# Patient Record
Sex: FEMALE | Race: WHITE | NOT HISPANIC OR LATINO
[De-identification: names, ages, dates, MRNs, and addresses within clinical notes are randomized per-mention and may not be internally consistent; named-entity substitution may affect disease eponyms.]

---

## 2017-02-15 ENCOUNTER — APPOINTMENT (OUTPATIENT)
Dept: ENDOCRINOLOGY | Facility: CLINIC | Age: 37
End: 2017-02-15

## 2017-02-15 VITALS
WEIGHT: 199 LBS | BODY MASS INDEX: 31.23 KG/M2 | OXYGEN SATURATION: 99 % | HEIGHT: 67 IN | DIASTOLIC BLOOD PRESSURE: 60 MMHG | HEART RATE: 81 BPM | SYSTOLIC BLOOD PRESSURE: 110 MMHG

## 2017-03-02 ENCOUNTER — RESULT REVIEW (OUTPATIENT)
Age: 37
End: 2017-03-02

## 2017-09-27 ENCOUNTER — RESULT REVIEW (OUTPATIENT)
Age: 37
End: 2017-09-27

## 2017-10-04 ENCOUNTER — CLINICAL ADVICE (OUTPATIENT)
Age: 37
End: 2017-10-04

## 2017-10-30 ENCOUNTER — RX RENEWAL (OUTPATIENT)
Age: 37
End: 2017-10-30

## 2018-04-25 ENCOUNTER — RESULT REVIEW (OUTPATIENT)
Age: 38
End: 2018-04-25

## 2018-05-07 ENCOUNTER — RX RENEWAL (OUTPATIENT)
Age: 38
End: 2018-05-07

## 2018-06-13 ENCOUNTER — APPOINTMENT (OUTPATIENT)
Dept: ENDOCRINOLOGY | Facility: CLINIC | Age: 38
End: 2018-06-13

## 2018-10-03 ENCOUNTER — APPOINTMENT (OUTPATIENT)
Dept: ENDOCRINOLOGY | Facility: CLINIC | Age: 38
End: 2018-10-03

## 2018-10-24 ENCOUNTER — APPOINTMENT (OUTPATIENT)
Dept: ENDOCRINOLOGY | Facility: CLINIC | Age: 38
End: 2018-10-24
Payer: COMMERCIAL

## 2018-10-24 VITALS
HEART RATE: 88 BPM | WEIGHT: 234 LBS | OXYGEN SATURATION: 98 % | DIASTOLIC BLOOD PRESSURE: 60 MMHG | BODY MASS INDEX: 36.73 KG/M2 | HEIGHT: 67 IN | SYSTOLIC BLOOD PRESSURE: 120 MMHG

## 2018-10-24 PROCEDURE — 99215 OFFICE O/P EST HI 40 MIN: CPT

## 2018-10-29 LAB
17OHP SERPL-MCNC: 26 NG/DL
25(OH)D3 SERPL-MCNC: 20.3 NG/ML
ACTH SER-ACNC: 13 PG/ML
ALBUMIN SERPL ELPH-MCNC: 4.5 G/DL
ALP BLD-CCNC: 111 U/L
ALT SERPL-CCNC: 88 U/L
ANION GAP SERPL CALC-SCNC: 16 MMOL/L
AST SERPL-CCNC: 48 U/L
BILIRUB SERPL-MCNC: 0.3 MG/DL
BUN SERPL-MCNC: 20 MG/DL
CALCIUM SERPL-MCNC: 10.1 MG/DL
CHLORIDE SERPL-SCNC: 104 MMOL/L
CHOLEST SERPL-MCNC: 159 MG/DL
CHOLEST/HDLC SERPL: 5.5 RATIO
CO2 SERPL-SCNC: 21 MMOL/L
CORTIS SERPL-MCNC: 6.1 UG/DL
CREAT SERPL-MCNC: 0.73 MG/DL
DHEA-S SERPL-MCNC: 153 UG/DL
ESTRADIOL SERPL-MCNC: 37 PG/ML
FSH SERPL-MCNC: 5.3 IU/L
GH SERPL-MCNC: 0.06 NG/ML
GLUCOSE SERPL-MCNC: 92 MG/DL
HBA1C MFR BLD HPLC: 5.5 %
HDLC SERPL-MCNC: 29 MG/DL
IGF BP1 SERPL-MCNC: 194 NG/ML
LDLC SERPL CALC-MCNC: 72 MG/DL
LH SERPL-ACNC: 8 IU/L
POTASSIUM SERPL-SCNC: 4.7 MMOL/L
PROLACTIN SERPL-MCNC: 4.8 NG/ML
PROT SERPL-MCNC: 7.3 G/DL
SODIUM SERPL-SCNC: 141 MMOL/L
T4 FREE SERPL-MCNC: 1.2 NG/DL
TESTOST SERPL-MCNC: 21.5 NG/DL
TRIGL SERPL-MCNC: 288 MG/DL
TSH SERPL-ACNC: 0.01 UIU/ML

## 2019-05-06 ENCOUNTER — RESULT REVIEW (OUTPATIENT)
Age: 39
End: 2019-05-06

## 2019-07-24 ENCOUNTER — APPOINTMENT (OUTPATIENT)
Dept: ENDOCRINOLOGY | Facility: CLINIC | Age: 39
End: 2019-07-24
Payer: COMMERCIAL

## 2019-07-24 VITALS
HEART RATE: 84 BPM | OXYGEN SATURATION: 98 % | DIASTOLIC BLOOD PRESSURE: 76 MMHG | HEIGHT: 67 IN | SYSTOLIC BLOOD PRESSURE: 118 MMHG | WEIGHT: 238 LBS | BODY MASS INDEX: 37.35 KG/M2

## 2019-07-24 DIAGNOSIS — R94.6 ABNORMAL RESULTS OF THYROID FUNCTION STUDIES: ICD-10-CM

## 2019-07-24 PROCEDURE — 99215 OFFICE O/P EST HI 40 MIN: CPT

## 2019-07-25 PROBLEM — R94.6 ABNORMAL THYROID FUNCTION TEST: Status: ACTIVE | Noted: 2017-02-15

## 2019-07-29 ENCOUNTER — MEDICATION RENEWAL (OUTPATIENT)
Age: 39
End: 2019-07-29

## 2019-07-29 LAB
25(OH)D3 SERPL-MCNC: 21.7 NG/ML
ACTH SER-ACNC: 18.1 PG/ML
ALBUMIN SERPL ELPH-MCNC: 4.2 G/DL
ALP BLD-CCNC: 85 U/L
ALT SERPL-CCNC: 66 U/L
ANION GAP SERPL CALC-SCNC: 12 MMOL/L
AST SERPL-CCNC: 28 U/L
BILIRUB SERPL-MCNC: 0.4 MG/DL
BUN SERPL-MCNC: 14 MG/DL
CALCIUM SERPL-MCNC: 9.7 MG/DL
CHLORIDE SERPL-SCNC: 109 MMOL/L
CHOLEST SERPL-MCNC: 191 MG/DL
CHOLEST/HDLC SERPL: 7.1 RATIO
CO2 SERPL-SCNC: 22 MMOL/L
CORTIS SERPL-MCNC: 6.6 UG/DL
CREAT SERPL-MCNC: 0.68 MG/DL
ESTIMATED AVERAGE GLUCOSE: 108 MG/DL
ESTRADIOL SERPL-MCNC: 27 PG/ML
FSH SERPL-MCNC: 5.2 IU/L
GH SERPL-MCNC: 0.06 NG/ML
GLUCOSE SERPL-MCNC: 109 MG/DL
HBA1C MFR BLD HPLC: 5.4 %
HDLC SERPL-MCNC: 27 MG/DL
IGF BP1 SERPL-MCNC: 175 NG/ML
INSULIN SERPL-MCNC: 35.6 UU/ML
LDLC SERPL CALC-MCNC: 123 MG/DL
LH SERPL-ACNC: 6.2 IU/L
POTASSIUM SERPL-SCNC: 4.5 MMOL/L
PROLACTIN SERPL-MCNC: 6.4 NG/ML
PROT SERPL-MCNC: 6.5 G/DL
SODIUM SERPL-SCNC: 142 MMOL/L
T3 SERPL-MCNC: 145 NG/DL
T4 FREE SERPL-MCNC: 1 NG/DL
THYROPEROXIDASE AB SERPL IA-ACNC: <10 IU/ML
TRIGL SERPL-MCNC: 206 MG/DL
TSH RECEPTOR AB: <1.1 IU/L
TSH SERPL-ACNC: 0.07 UIU/ML

## 2019-08-01 LAB
MONOMERIC PROLACTIN (ICMA)*: 4.7 NG/ML
PERCENT MACROPROLACTIN: 13 %
PROLACTIN, SERUM (ICMA)*: 5.4 NG/ML

## 2019-08-22 ENCOUNTER — LABORATORY RESULT (OUTPATIENT)
Age: 39
End: 2019-08-22

## 2019-08-22 ENCOUNTER — APPOINTMENT (OUTPATIENT)
Dept: ENDOCRINOLOGY | Facility: CLINIC | Age: 39
End: 2019-08-22
Payer: COMMERCIAL

## 2019-08-22 VITALS — HEART RATE: 70 BPM | SYSTOLIC BLOOD PRESSURE: 100 MMHG | OXYGEN SATURATION: 98 % | DIASTOLIC BLOOD PRESSURE: 70 MMHG

## 2019-08-22 PROCEDURE — 99213 OFFICE O/P EST LOW 20 MIN: CPT | Mod: 25

## 2019-08-22 PROCEDURE — 76536 US EXAM OF HEAD AND NECK: CPT

## 2019-08-22 NOTE — IMPRESSION
[FreeTextEntry1] : Left sided dominant nodule has been FNAed x 2 in the past, now stable in size  [FreeTextEntry2] : Follow up in one year with ultrasound

## 2019-08-22 NOTE — PHYSICAL EXAM
[Alert] : alert [No Acute Distress] : no acute distress [Well Nourished] : well nourished [Well Developed] : well developed [Normal Sclera/Conjunctiva] : normal sclera/conjunctiva [PERRL] : pupils equal, round and reactive to light [No Proptosis] : no proptosis [EOMI] : extra ocular movement intact [Normal Outer Ear/Nose] : the ears and nose were normal in appearance [Normal Hearing] : hearing was normal [Normal TMs] : both tympanic membranes were normal [No Neck Mass] : no neck mass was observed [Supple] : the neck was supple [Thyroid Not Enlarged] : the thyroid was not enlarged [No Respiratory Distress] : no respiratory distress [Normal Rate and Effort] : normal respiratory rhythm and effort [No Accessory Muscle Use] : no accessory muscle use [Clear to Auscultation] : lungs were clear to auscultation bilaterally [Normal Rate] : heart rate was normal  [Normal S1, S2] : normal S1 and S2 [Regular Rhythm] : with a regular rhythm [Normal Bowel Sounds] : normal bowel sounds [Not Tender] : non-tender [Soft] : abdomen soft [Not Distended] : not distended [Normal Gait] : normal gait [No Joint Swelling] : no joint swelling seen [No Clubbing, Cyanosis] : no clubbing  or cyanosis of the fingernails [Normal Strength/Tone] : muscle strength and tone were normal [No Rash] : no rash [No Skin Lesions] : no skin lesions [Normal Reflexes] : deep tendon reflexes were 2+ and symmetric [No Motor Deficits] : the motor exam was normal [No Tremors] : no tremors [Oriented x3] : oriented to person, place, and time [Normal Insight/Judgement] : insight and judgment were intact [Normal Affect] : the affect was normal [Normal Mood] : the mood was normal

## 2019-08-22 NOTE — ASSESSMENT
[FreeTextEntry1] : 38 year old female here for follow up of thyroid nodules. \par In office thyroid ultrasound today showing stability in the right sided thyroid nodules which are sub-cm and also showing stability in the left sided thyroid nodule. \par ( Previously FNA in 2005 and 2009 for the left nodule was benign)\par -Will follow up in one year

## 2019-08-22 NOTE — PROCEDURE
[Report dated ___] : Report dated [unfilled] [Multinodular Goiter] : multinodular goiter [Solid] : solid [Upper] : upper pole there is a  [No calcification] : no calcification [Right Thyroid] : right [Lower] : lower pole there is a  [Heterogeneous] : heterogenous nodule [No vascularity] : no vascularity [1] : 1 [Left Thyroid] : left [Mid] : mid pole there is a  [Mixed] : mixed [Isoechoic solid component] : with isoechoic solid component [Ovoid] : ovoid in shape [Smooth] : smooth [No] : does not have a halo [No calcifications] : no calcifications [Peripheral and scant  internal vascularity] : peripheral and scant internal vascularity [3] : 3 [No abnormal lymph nodes are seen.] : no abnormal lymph nodes are seen [FreeTextEntry1] : 3.6 x 1.15 x 1.59 [FreeTextEntry5] : 3.96 x 2.07 x 2.75 [FreeTextEntry2] : 0.16 [FreeTextEntry3] : 3.83 x 1.75 x 2.95

## 2019-08-22 NOTE — PHYSICAL EXAM
[Alert] : alert [No Acute Distress] : no acute distress [Well Nourished] : well nourished [Well Developed] : well developed [Normal Sclera/Conjunctiva] : normal sclera/conjunctiva [PERRL] : pupils equal, round and reactive to light [EOMI] : extra ocular movement intact [No Proptosis] : no proptosis [Normal Outer Ear/Nose] : the ears and nose were normal in appearance [Normal Hearing] : hearing was normal [Normal TMs] : both tympanic membranes were normal [No Neck Mass] : no neck mass was observed [Thyroid Not Enlarged] : the thyroid was not enlarged [Supple] : the neck was supple [No Respiratory Distress] : no respiratory distress [Normal Rate and Effort] : normal respiratory rhythm and effort [No Accessory Muscle Use] : no accessory muscle use [Clear to Auscultation] : lungs were clear to auscultation bilaterally [Normal Rate] : heart rate was normal  [Normal S1, S2] : normal S1 and S2 [Regular Rhythm] : with a regular rhythm [Normal Bowel Sounds] : normal bowel sounds [Soft] : abdomen soft [Not Tender] : non-tender [Not Distended] : not distended [Normal Gait] : normal gait [No Joint Swelling] : no joint swelling seen [No Clubbing, Cyanosis] : no clubbing  or cyanosis of the fingernails [Normal Strength/Tone] : muscle strength and tone were normal [No Rash] : no rash [No Skin Lesions] : no skin lesions [Normal Reflexes] : deep tendon reflexes were 2+ and symmetric [No Tremors] : no tremors [No Motor Deficits] : the motor exam was normal [Oriented x3] : oriented to person, place, and time [Normal Insight/Judgement] : insight and judgment were intact [Normal Affect] : the affect was normal [Normal Mood] : the mood was normal

## 2019-08-22 NOTE — REVIEW OF SYSTEMS
[Fatigue] : no fatigue [Recent Weight Gain (___ Lbs)] : no recent weight gain [Decreased Appetite] : appetite not decreased [Recent Weight Loss (___ Lbs)] : no recent weight loss [Visual Field Defect] : no visual field defect [Blurry Vision] : no blurred vision [Dry Eyes] : no dryness of the eyes [Eyes Itch] : no itching of the eyes [Dysphagia] : no dysphagia [Neck Pain] : no neck pain [Dysphonia] : no dysphonia [Nasal Congestion] : no nasal congestion [Chest Pain] : no chest pain [Palpitations] : no palpitations [Heart Rate Is Slow] : the heart rate was not slow [Heart Rate Is Fast] : the heart rate was not fast [Shortness Of Breath] : no shortness of breath [Wheezing] : no wheezing was heard [SOB on Exertion] : no shortness of breath during exertion [Cough] : no cough [Nausea] : no nausea [Vomiting] : no vomiting was observed [Diarrhea] : no diarrhea [Constipation] : no constipation [Polyuria] : no polyuria [Irregular Menses] : regular menses [Joint Stiffness] : no joint stiffness [Joint Pain] : no joint pain [Muscle Weakness] : no muscle weakness [Muscle Cramps] : no muscle cramps [Acanthosis] : no acanthosis  [Hirsutism] : no hirsutism [Acne] : no acne [Tremors] : no tremors [Hair Loss] : no hair loss [Headache] : no headaches [Depression] : no depression [Anxiety] : no anxiety [Polydipsia] : no polydipsia [Galactorrhea] : no galactorrhea  [Cold Intolerance] : cold tolerant [Easy Bleeding] : no ~M tendency for easy bleeding [Heat Intolerance] : heat tolerant [Easy Bruising] : no tendency for easy bruising [Swelling] : no swelling

## 2019-08-22 NOTE — HISTORY OF PRESENT ILLNESS
[FreeTextEntry1] : 38 year old female with history of thyroid nodules here for follow up \par \par -She has a long standing history of multiple thyroid nodules and her left sided dominant nodule has been biopsised in 2005 and 2009. ( Both times benign) \par -She denied any compressive symptoms\par -No family history of thyroid cancer and no radiation exposure to head or neck area \par -She takes levothyrozine 50 mcg daily \par

## 2019-08-22 NOTE — HISTORY OF PRESENT ILLNESS
CERTIFICATE OF WORK      May 22, 2019      RE:   Josie KO  [address] [facility]  [place] [address]    To whom it may concern:    This is to certify that Josie KO has been under Cristel Cifuentes NP's care from 5/22/2019 and is excused from work on 5/22/19.    RESTRICTIONS:     REMARKS:       SIGNATURE:___________________________________________,   5/22/2019  Mee Gay MA/Cristel Cifuentes NP   [facility]  [address] [facility]  [place] [address] [phone]   [FreeTextEntry1] : 38 year old female with history of thyroid nodules here for follow up \par \par -She has a long standing history of multiple thyroid nodules and her left sided dominant nodule has been biopsised in 2005 and 2009. ( Both times benign) \par -She denied any compressive symptoms\par -No family history of thyroid cancer and no radiation exposure to head or neck area \par -She takes levothyrozine 50 mcg daily \par

## 2019-08-22 NOTE — REVIEW OF SYSTEMS
[Fatigue] : no fatigue [Recent Weight Gain (___ Lbs)] : no recent weight gain [Decreased Appetite] : appetite not decreased [Visual Field Defect] : no visual field defect [Recent Weight Loss (___ Lbs)] : no recent weight loss [Blurry Vision] : no blurred vision [Dry Eyes] : no dryness of the eyes [Eyes Itch] : no itching of the eyes [Dysphagia] : no dysphagia [Neck Pain] : no neck pain [Dysphonia] : no dysphonia [Nasal Congestion] : no nasal congestion [Chest Pain] : no chest pain [Palpitations] : no palpitations [Heart Rate Is Slow] : the heart rate was not slow [Heart Rate Is Fast] : the heart rate was not fast [Shortness Of Breath] : no shortness of breath [Wheezing] : no wheezing was heard [SOB on Exertion] : no shortness of breath during exertion [Cough] : no cough [Vomiting] : no vomiting was observed [Nausea] : no nausea [Diarrhea] : no diarrhea [Constipation] : no constipation [Polyuria] : no polyuria [Joint Pain] : no joint pain [Joint Stiffness] : no joint stiffness [Irregular Menses] : regular menses [Muscle Weakness] : no muscle weakness [Muscle Cramps] : no muscle cramps [Acanthosis] : no acanthosis  [Acne] : no acne [Hirsutism] : no hirsutism [Tremors] : no tremors [Headache] : no headaches [Hair Loss] : no hair loss [Depression] : no depression [Anxiety] : no anxiety [Polydipsia] : no polydipsia [Galactorrhea] : no galactorrhea  [Cold Intolerance] : cold tolerant [Heat Intolerance] : heat tolerant [Easy Bleeding] : no ~M tendency for easy bleeding [Easy Bruising] : no tendency for easy bruising [Swelling] : no swelling

## 2019-08-22 NOTE — PROCEDURE
[Report dated ___] : Report dated [unfilled] [Multinodular Goiter] : multinodular goiter [Upper] : upper pole there is a  [Solid] : solid [No calcification] : no calcification [Right Thyroid] : right [Lower] : lower pole there is a  [Heterogeneous] : heterogenous nodule [No vascularity] : no vascularity [1] : 1 [Left Thyroid] : left [Mid] : mid pole there is a  [Mixed] : mixed [Isoechoic solid component] : with isoechoic solid component [Ovoid] : ovoid in shape [Smooth] : smooth [No] : does not have a halo [No calcifications] : no calcifications [Peripheral and scant  internal vascularity] : peripheral and scant internal vascularity [3] : 3 [No abnormal lymph nodes are seen.] : no abnormal lymph nodes are seen [FreeTextEntry1] : 3.6 x 1.15 x 1.59 [FreeTextEntry5] : 3.96 x 2.07 x 2.75 [FreeTextEntry2] : 0.16 [FreeTextEntry3] : 3.83 x 1.75 x 2.95

## 2020-02-05 ENCOUNTER — APPOINTMENT (OUTPATIENT)
Dept: ENDOCRINOLOGY | Facility: CLINIC | Age: 40
End: 2020-02-05

## 2020-06-17 ENCOUNTER — APPOINTMENT (OUTPATIENT)
Dept: ENDOCRINOLOGY | Facility: CLINIC | Age: 40
End: 2020-06-17
Payer: MEDICAID

## 2020-06-17 VITALS
HEIGHT: 67 IN | OXYGEN SATURATION: 98 % | BODY MASS INDEX: 39.24 KG/M2 | TEMPERATURE: 98.1 F | WEIGHT: 250 LBS | HEART RATE: 89 BPM | SYSTOLIC BLOOD PRESSURE: 120 MMHG | DIASTOLIC BLOOD PRESSURE: 74 MMHG

## 2020-06-17 PROCEDURE — 99214 OFFICE O/P EST MOD 30 MIN: CPT

## 2020-06-26 LAB
25(OH)D3 SERPL-MCNC: 19.4 NG/ML
ALBUMIN SERPL ELPH-MCNC: 4.7 G/DL
ALP BLD-CCNC: 119 U/L
ALT SERPL-CCNC: 143 U/L
ANION GAP SERPL CALC-SCNC: 13 MMOL/L
AST SERPL-CCNC: 77 U/L
BILIRUB SERPL-MCNC: 0.6 MG/DL
BUN SERPL-MCNC: 13 MG/DL
CALCIUM SERPL-MCNC: 10.5 MG/DL
CHLORIDE SERPL-SCNC: 102 MMOL/L
CHOLEST SERPL-MCNC: 214 MG/DL
CHOLEST/HDLC SERPL: 6.7 RATIO
CO2 SERPL-SCNC: 26 MMOL/L
CREAT SERPL-MCNC: 0.84 MG/DL
ESTIMATED AVERAGE GLUCOSE: 114 MG/DL
FSH SERPL-MCNC: 6 IU/L
GLUCOSE SERPL-MCNC: 97 MG/DL
HBA1C MFR BLD HPLC: 5.6 %
HDLC SERPL-MCNC: 32 MG/DL
IGF BP1 SERPL-MCNC: 215 NG/ML
LDLC SERPL CALC-MCNC: 122 MG/DL
LH SERPL-ACNC: 10.7 IU/L
POTASSIUM SERPL-SCNC: 4.8 MMOL/L
PROT SERPL-MCNC: 7.3 G/DL
SODIUM SERPL-SCNC: 141 MMOL/L
T3 SERPL-MCNC: 182 NG/DL
T4 FREE SERPL-MCNC: 1.4 NG/DL
TRIGL SERPL-MCNC: 301 MG/DL
TSH SERPL-ACNC: 0.01 UIU/ML

## 2020-07-08 ENCOUNTER — RESULT REVIEW (OUTPATIENT)
Age: 40
End: 2020-07-08

## 2020-09-09 ENCOUNTER — RESULT REVIEW (OUTPATIENT)
Age: 40
End: 2020-09-09

## 2020-12-10 ENCOUNTER — OUTPATIENT (OUTPATIENT)
Dept: OUTPATIENT SERVICES | Facility: HOSPITAL | Age: 40
LOS: 1 days | End: 2020-12-10

## 2020-12-10 VITALS
WEIGHT: 259.93 LBS | OXYGEN SATURATION: 98 % | DIASTOLIC BLOOD PRESSURE: 78 MMHG | TEMPERATURE: 99 F | HEIGHT: 66.5 IN | HEART RATE: 78 BPM | RESPIRATION RATE: 16 BRPM | SYSTOLIC BLOOD PRESSURE: 126 MMHG

## 2020-12-10 DIAGNOSIS — Z98.890 OTHER SPECIFIED POSTPROCEDURAL STATES: Chronic | ICD-10-CM

## 2020-12-10 DIAGNOSIS — N92.6 IRREGULAR MENSTRUATION, UNSPECIFIED: ICD-10-CM

## 2020-12-10 LAB
ALBUMIN SERPL ELPH-MCNC: 4.4 G/DL — SIGNIFICANT CHANGE UP (ref 3.3–5)
ALP SERPL-CCNC: 102 U/L — SIGNIFICANT CHANGE UP (ref 40–120)
ALT FLD-CCNC: 132 U/L — HIGH (ref 4–33)
ANION GAP SERPL CALC-SCNC: 15 MMOL/L — HIGH (ref 7–14)
APTT BLD: 30.3 SEC — SIGNIFICANT CHANGE UP (ref 27–36.3)
AST SERPL-CCNC: 58 U/L — HIGH (ref 4–32)
BILIRUB SERPL-MCNC: 0.3 MG/DL — SIGNIFICANT CHANGE UP (ref 0.2–1.2)
BUN SERPL-MCNC: 18 MG/DL — SIGNIFICANT CHANGE UP (ref 7–23)
CALCIUM SERPL-MCNC: 10.3 MG/DL — SIGNIFICANT CHANGE UP (ref 8.4–10.5)
CHLORIDE SERPL-SCNC: 104 MMOL/L — SIGNIFICANT CHANGE UP (ref 98–107)
CO2 SERPL-SCNC: 21 MMOL/L — LOW (ref 22–31)
CREAT SERPL-MCNC: 0.73 MG/DL — SIGNIFICANT CHANGE UP (ref 0.5–1.3)
GLUCOSE SERPL-MCNC: 64 MG/DL — LOW (ref 70–99)
HCG SERPL-ACNC: <5 MIU/ML — SIGNIFICANT CHANGE UP
HCT VFR BLD CALC: 48 % — HIGH (ref 34.5–45)
HGB BLD-MCNC: 15.5 G/DL — SIGNIFICANT CHANGE UP (ref 11.5–15.5)
INR BLD: 1.05 RATIO — SIGNIFICANT CHANGE UP (ref 0.88–1.17)
MCHC RBC-ENTMCNC: 27.3 PG — SIGNIFICANT CHANGE UP (ref 27–34)
MCHC RBC-ENTMCNC: 32.3 GM/DL — SIGNIFICANT CHANGE UP (ref 32–36)
MCV RBC AUTO: 84.5 FL — SIGNIFICANT CHANGE UP (ref 80–100)
NRBC # BLD: 0 /100 WBCS — SIGNIFICANT CHANGE UP
NRBC # FLD: 0 K/UL — SIGNIFICANT CHANGE UP
PLATELET # BLD AUTO: 305 K/UL — SIGNIFICANT CHANGE UP (ref 150–400)
POTASSIUM SERPL-MCNC: 3.8 MMOL/L — SIGNIFICANT CHANGE UP (ref 3.5–5.3)
POTASSIUM SERPL-SCNC: 3.8 MMOL/L — SIGNIFICANT CHANGE UP (ref 3.5–5.3)
PROT SERPL-MCNC: 7.5 G/DL — SIGNIFICANT CHANGE UP (ref 6–8.3)
PROTHROM AB SERPL-ACNC: 12.1 SEC — SIGNIFICANT CHANGE UP (ref 9.8–13.1)
RBC # BLD: 5.68 M/UL — HIGH (ref 3.8–5.2)
RBC # FLD: 13.2 % — SIGNIFICANT CHANGE UP (ref 10.3–14.5)
SODIUM SERPL-SCNC: 140 MMOL/L — SIGNIFICANT CHANGE UP (ref 135–145)
WBC # BLD: 10.38 K/UL — SIGNIFICANT CHANGE UP (ref 3.8–10.5)
WBC # FLD AUTO: 10.38 K/UL — SIGNIFICANT CHANGE UP (ref 3.8–10.5)

## 2020-12-10 RX ORDER — SODIUM CHLORIDE 9 MG/ML
1000 INJECTION, SOLUTION INTRAVENOUS
Refills: 0 | Status: DISCONTINUED | OUTPATIENT
Start: 2020-12-15 | End: 2020-12-29

## 2020-12-10 NOTE — H&P PST ADULT - NEGATIVE OPHTHALMOLOGIC SYMPTOMS
no loss of vision R/no pain R/no loss of vision L/no blurred vision R/no pain L/no blurred vision L/no diplopia/no photophobia

## 2020-12-10 NOTE — H&P PST ADULT - PRIMARY CARE PROVIDER
Eun Khan endocrinologist [facility] [place] [facility] [phone]

## 2020-12-10 NOTE — H&P PST ADULT - NSANTHOSAYNRD_GEN_A_CORE
No. CORRY screening performed.  STOP BANG Legend: 0-2 = LOW Risk; 3-4 = INTERMEDIATE Risk; 5-8 = HIGH Risk

## 2020-12-10 NOTE — H&P PST ADULT - ASSESSMENT
polyp of corpus uteri  irregular menstruation Problem: polyp of corpus uteri, irregular menstruation   Assessment and Plan: Pt is tentatively scheduled for dilation curettage hysteroscopy with symphion mirena intra uterine device insertion for 12/15/20. Pre-op instructions provided. Pt given verbal and written instructions with teach back on pepcid. Urine cup provided for day of procedure pregnancy test. Pt has a scheduled preop COVID test.  Pt verbalized understanding with return demonstration.     Problem: hypothyroidism  Assessment and Plan: Patient instructed to take levothyroxine with a sip of water on the morning of procedure.     PCN allergy OR booking notified.

## 2020-12-10 NOTE — H&P PST ADULT - NEGATIVE NEUROLOGICAL SYMPTOMS
no transient paralysis/no headache/no paresthesias/no generalized seizures/no difficulty walking/no syncope/no tremors/no weakness

## 2020-12-10 NOTE — H&P PST ADULT - MUSCULOSKELETAL
details… detailed exam no calf tenderness/normal strength/no joint warmth/ROM intact/no joint swelling/no joint erythema

## 2020-12-10 NOTE — H&P PST ADULT - HISTORY OF PRESENT ILLNESS
40 year old female presurgical testing with diagnosis of polyp of corpus uteri and irregular menstruation scheduled for dilation curettage hysteroscopy with symphion mirena intra uterine device insertion. Pt with last menses a couple of months ago, on Provera.

## 2020-12-10 NOTE — H&P PST ADULT - NSICDXPASTMEDICALHX_GEN_ALL_CORE_FT
PAST MEDICAL HISTORY:  Factor V Leiden mutation     Hypothyroidism     Multinodular thyroid     NAFLD (nonalcoholic fatty liver disease)     Obesity     Polyp of corpus uteri

## 2020-12-10 NOTE — H&P PST ADULT - RS GEN PE MLT RESP DETAILS PC
no rhonchi/no rales/respirations non-labored/airway patent/no wheezes/good air movement/clear to auscultation bilaterally/breath sounds equal

## 2020-12-13 ENCOUNTER — APPOINTMENT (OUTPATIENT)
Dept: DISASTER EMERGENCY | Facility: CLINIC | Age: 40
End: 2020-12-13

## 2020-12-14 ENCOUNTER — TRANSCRIPTION ENCOUNTER (OUTPATIENT)
Age: 40
End: 2020-12-14

## 2020-12-15 ENCOUNTER — OUTPATIENT (OUTPATIENT)
Dept: OUTPATIENT SERVICES | Facility: HOSPITAL | Age: 40
LOS: 1 days | Discharge: HOME | End: 2020-12-15
Payer: MEDICAID

## 2020-12-15 ENCOUNTER — RESULT REVIEW (OUTPATIENT)
Age: 40
End: 2020-12-15

## 2020-12-15 VITALS
SYSTOLIC BLOOD PRESSURE: 130 MMHG | TEMPERATURE: 98 F | HEIGHT: 66 IN | DIASTOLIC BLOOD PRESSURE: 80 MMHG | OXYGEN SATURATION: 98 % | WEIGHT: 259.93 LBS | HEART RATE: 80 BPM | RESPIRATION RATE: 17 BRPM

## 2020-12-15 VITALS
DIASTOLIC BLOOD PRESSURE: 70 MMHG | SYSTOLIC BLOOD PRESSURE: 126 MMHG | HEART RATE: 85 BPM | OXYGEN SATURATION: 100 % | RESPIRATION RATE: 15 BRPM

## 2020-12-15 DIAGNOSIS — Z98.890 OTHER SPECIFIED POSTPROCEDURAL STATES: Chronic | ICD-10-CM

## 2020-12-15 DIAGNOSIS — N92.6 IRREGULAR MENSTRUATION, UNSPECIFIED: ICD-10-CM

## 2020-12-15 LAB — HCG UR QL: NEGATIVE — SIGNIFICANT CHANGE UP

## 2020-12-15 PROCEDURE — 88305 TISSUE EXAM BY PATHOLOGIST: CPT | Mod: 26

## 2020-12-15 RX ORDER — LEVOTHYROXINE SODIUM 125 MCG
1 TABLET ORAL
Qty: 0 | Refills: 0 | DISCHARGE

## 2020-12-15 RX ORDER — SODIUM CHLORIDE 9 MG/ML
1000 INJECTION, SOLUTION INTRAVENOUS
Refills: 0 | Status: DISCONTINUED | OUTPATIENT
Start: 2020-12-15 | End: 2020-12-29

## 2020-12-15 RX ORDER — MEDROXYPROGESTERONE ACETATE 150 MG/ML
1 INJECTION, SUSPENSION, EXTENDED RELEASE INTRAMUSCULAR
Qty: 0 | Refills: 0 | DISCHARGE

## 2020-12-15 RX ORDER — CHOLECALCIFEROL (VITAMIN D3) 125 MCG
1 CAPSULE ORAL
Qty: 0 | Refills: 0 | DISCHARGE

## 2020-12-15 NOTE — BRIEF OPERATIVE NOTE - OPERATION/FINDINGS
EUA revealed a 9-10wk size retroverted uterus, nonpalpable adnexa bilaterally. Grossly normal external genitalia. Visualized with a hysteroscope revealed a 5cm anterior polyp partially infarcted with a adjacent smaller anterior polyp. Ostia were visualized and wnl bilaterally. Cervix and vagina were noted to be absent of lesions or masses.    Bedside ultrasound guided insertion of Mirena device. EUA revealed a 9-10wk size retroverted uterus, nonpalpable adnexa bilaterally. Grossly normal external genitalia. Visualized with a hysteroscope revealed a 5cm anterior partially infarcted polyp with a adjacent smaller anterior polyp. Ostia were visualized and wnl bilaterally. Cervix and vagina were noted to be absent of lesions or masses.    Bedside ultrasound guided insertion of Mirena device.

## 2020-12-15 NOTE — BRIEF OPERATIVE NOTE - NSICDXBRIEFPROCEDURE_GEN_ALL_CORE_FT
PROCEDURES:  Insertion of Mirena IUD 15-Dec-2020 16:52:37  Cirilo Frye  Exam under anesthesia, pelvis 15-Dec-2020 16:52:08  Cirilo Frye  Hysteroscopy, with dilation and curettage of uterus, with surgical removal of endometrium if indicated using bipolar tissue resection system 15-Dec-2020 16:51:57  Cirilo Frye

## 2020-12-15 NOTE — ASU DISCHARGE PLAN (ADULT/PEDIATRIC) - CARE PROVIDER_API CALL
Robert Goldstein  OBSTETRICS AND GYNECOLOGY  [address] [place] [address]  Phone: [phone]  Fax: [phone]  Follow Up Time:

## 2020-12-15 NOTE — ASU DISCHARGE PLAN (ADULT/PEDIATRIC) - NURSING INSTRUCTIONS
You received IV Tylenol for pain management at _12__. Please DO NOT take any Tylenol (Acetaminophen) containing products, such as Vicodin, Percocet, Excedrin, and cold medications for the next 6 hours (until __6_ PM). DO NOT TAKE MORE THAN 3000 MG OF TYLENOL in a 24 hour period.    ****You received IV Toradol for pain management at __12:30_. Please DO NOT take Motrin/Ibuprofen/Advil/Aleve/NSAIDs (Non-Steroidal Anti-Inflammatory Drugs) for the next 6 hours (until __6:30_ PM).

## 2020-12-16 ENCOUNTER — APPOINTMENT (OUTPATIENT)
Dept: ENDOCRINOLOGY | Facility: CLINIC | Age: 40
End: 2020-12-16

## 2020-12-21 LAB — SURGICAL PATHOLOGY STUDY: SIGNIFICANT CHANGE UP

## 2021-01-05 PROBLEM — E04.2 NONTOXIC MULTINODULAR GOITER: Chronic | Status: ACTIVE | Noted: 2020-12-10

## 2021-01-05 PROBLEM — K76.0 FATTY (CHANGE OF) LIVER, NOT ELSEWHERE CLASSIFIED: Chronic | Status: ACTIVE | Noted: 2020-12-10

## 2021-01-05 PROBLEM — E66.9 OBESITY, UNSPECIFIED: Chronic | Status: ACTIVE | Noted: 2020-12-10

## 2021-01-05 PROBLEM — N84.0 POLYP OF CORPUS UTERI: Chronic | Status: ACTIVE | Noted: 2020-12-10

## 2021-01-05 PROBLEM — D68.51 ACTIVATED PROTEIN C RESISTANCE: Chronic | Status: ACTIVE | Noted: 2020-12-10

## 2021-01-05 PROBLEM — E03.9 HYPOTHYROIDISM, UNSPECIFIED: Chronic | Status: ACTIVE | Noted: 2020-12-10

## 2021-04-19 ENCOUNTER — APPOINTMENT (OUTPATIENT)
Dept: OTOLARYNGOLOGY | Facility: CLINIC | Age: 41
End: 2021-04-19

## 2021-05-05 ENCOUNTER — APPOINTMENT (OUTPATIENT)
Dept: ENDOCRINOLOGY | Facility: CLINIC | Age: 41
End: 2021-05-05
Payer: MEDICAID

## 2021-05-05 VITALS
DIASTOLIC BLOOD PRESSURE: 80 MMHG | TEMPERATURE: 97 F | SYSTOLIC BLOOD PRESSURE: 118 MMHG | BODY MASS INDEX: 40.57 KG/M2 | WEIGHT: 259 LBS | HEART RATE: 105 BPM | OXYGEN SATURATION: 95 %

## 2021-05-05 PROCEDURE — 99072 ADDL SUPL MATRL&STAF TM PHE: CPT

## 2021-05-05 PROCEDURE — 99215 OFFICE O/P EST HI 40 MIN: CPT

## 2021-05-05 NOTE — REASON FOR VISIT
[Follow - Up] : a follow-up visit [Hypothyroidism] : hypothyroidism [Pituitary Evaluation/ Disorder] : pituitary evaluation/disorder [Weight Management/Obesity] : weight management/obesity

## 2021-05-06 NOTE — HISTORY OF PRESENT ILLNESS
[FreeTextEntry1] : 40 year old female presents for follow up of several endocrine issues.\par Had COVID Feb 2021.\par Had surgery December hysteroscopy found polyp, now has Mirena IUD placed. Now no longer having periods.\par Gained 9 lbs since last visit. Tried various diets on/off. In the past did well with keto diet.\par Recently joined a coaching/weight loss program requesting a list of labs for further assessment.\par History of fatty liver.\par Denies heat/cold intolerance, fatigue more than usual, constipation, diarrhea, or palpitations.\par \par Thyroid nodules:  long standing history of multiple thyroid nodules and dominant left thyroid nodules that has been biopsied in 2005 and 2009 and reported as benign. The patient denies dysphagia, dyspnea or dysphonia. Prior uptake and scan in 2014 showing functional nodule but TFTs showed picture of secondary hypothyroidism and patient was clinically complaining of weight gain and appeared to be hypothyroid and not hyperthyroid. She was initiated on levothyroxine with subsequent dose adjustments.\par Current regimen of levothyroxine -  75 mcg x 5 days and 50 mcg x 2 days.\par She takes in AM on empty stomach.\par She had thyroid US Aug 2019 with Dr. Tolliver stable nodules with one year follow up recommended.\par \par Prior elevated IGF-1, glucose tolerance checked and was unrevealing.\par Chronic irregular periods, possible mild PCOS, didn't have issues getting pregnant.\par Noted with prediabetes several years ago.\par History of Vit D deficiency. Prescribed the ergocalciferol 50,000 weekly which she reports not always remembering to take.\par \par

## 2021-05-06 NOTE — PHYSICAL EXAM
[Alert] : alert [Well Nourished] : well nourished [No Acute Distress] : no acute distress [Well Developed] : well developed [Normal Sclera/Conjunctiva] : normal sclera/conjunctiva [EOMI] : extra ocular movement intact [No Proptosis] : no proptosis [Normal Oropharynx] : the oropharynx was normal [Thyroid Not Enlarged] : the thyroid was not enlarged [No Thyroid Nodules] : no palpable thyroid nodules [No Respiratory Distress] : no respiratory distress [No Accessory Muscle Use] : no accessory muscle use [Clear to Auscultation] : lungs were clear to auscultation bilaterally [Normal S1, S2] : normal S1 and S2 [Normal Rate] : heart rate was normal [Regular Rhythm] : with a regular rhythm [No Edema] : no peripheral edema [Pedal Pulses Normal] : the pedal pulses are present [Normal Bowel Sounds] : normal bowel sounds [Not Tender] : non-tender [Not Distended] : not distended [Soft] : abdomen soft [Normal Anterior Cervical Nodes] : no anterior cervical lymphadenopathy [No Spinal Tenderness] : no spinal tenderness [Spine Straight] : spine straight [No Stigmata of Cushings Syndrome] : no stigmata of Cushings Syndrome [Normal Gait] : normal gait [Normal Strength/Tone] : muscle strength and tone were normal [No Rash] : no rash [No Tremors] : no tremors [Oriented x3] : oriented to person, place, and time [Normal Affect] : the affect was normal [Normal Mood] : the mood was normal [Acanthosis Nigricans] : no acanthosis nigricans beer/wine/liquor

## 2021-05-06 NOTE — ASSESSMENT
[FreeTextEntry1] : 40F here for f/u of several endocrine issues:\par \par 1) Central hypothyroidism - Check TSH, free T4, T3 \par continue for now levothyroxine 75 mcg x 5 days, 50 mcg x 2 days per week \par \par 2) Pituitary cyst?\par Pituitary labs checked last visit within normal limits, defer further imaging at this time\par \par 3)multinodular thyroid- thyroid US 8/2019 stable.\par Schedule follow up ultrasound with Dr. Tolliver\par \par 4) Obesity \par Patient recently joined weight loss coaching program and requests list of labs for the program\par -Continue with lifestyle modification\par -She declines obesity medication \par recheck HbA1c\par recommended hepatology eval for fatty liver\par \par 4) Vit D deficiency - ergocalciferol weekly supplement\par \par 5) Prior elevated IGF-1 with negative OGTT - recheck IGF1\par \par F/u 6 months

## 2021-05-10 LAB
25(OH)D3 SERPL-MCNC: 16.6 NG/ML
ALBUMIN SERPL ELPH-MCNC: 4.5 G/DL
ALP BLD-CCNC: 111 U/L
ALT SERPL-CCNC: 72 U/L
ANION GAP SERPL CALC-SCNC: 14 MMOL/L
AST SERPL-CCNC: 40 U/L
BASOPHILS # BLD AUTO: 0.05 K/UL
BASOPHILS NFR BLD AUTO: 0.6 %
BILIRUB SERPL-MCNC: 0.5 MG/DL
BUN SERPL-MCNC: 16 MG/DL
CALCIUM SERPL-MCNC: 10.4 MG/DL
CHLORIDE SERPL-SCNC: 105 MMOL/L
CHOLEST SERPL-MCNC: 204 MG/DL
CO2 SERPL-SCNC: 21 MMOL/L
CORTIS SERPL-MCNC: 9.3 UG/DL
CREAT SERPL-MCNC: 0.74 MG/DL
CRP SERPL-MCNC: 4 MG/L
DHEA-S SERPL-MCNC: 161 UG/DL
EOSINOPHIL # BLD AUTO: 0.07 K/UL
EOSINOPHIL NFR BLD AUTO: 0.8 %
ESTIMATED AVERAGE GLUCOSE: 114 MG/DL
ESTRADIOL SERPL-MCNC: 21 PG/ML
FERRITIN SERPL-MCNC: 133 NG/ML
FOLATE SERPL-MCNC: 14.7 NG/ML
FSH SERPL-MCNC: 5.3 IU/L
GLUCOSE SERPL-MCNC: 107 MG/DL
HBA1C MFR BLD HPLC: 5.6 %
HCT VFR BLD CALC: 48.8 %
HCYS SERPL-MCNC: 8.8 UMOL/L
HDLC SERPL-MCNC: 26 MG/DL
HGB BLD-MCNC: 15.7 G/DL
IGF BP1 SERPL-MCNC: 219 NG/ML
IMM GRANULOCYTES NFR BLD AUTO: 0.2 %
INSULIN SERPL-MCNC: 58.1 UU/ML
IRON SATN MFR SERPL: 26 %
IRON SERPL-MCNC: 109 UG/DL
LDLC SERPL CALC-MCNC: 118 MG/DL
LH SERPL-ACNC: 9.4 IU/L
LYMPHOCYTES # BLD AUTO: 1.94 K/UL
LYMPHOCYTES NFR BLD AUTO: 21.5 %
MAGNESIUM SERPL-MCNC: 2 MG/DL
MAN DIFF?: NORMAL
MCHC RBC-ENTMCNC: 27.1 PG
MCHC RBC-ENTMCNC: 32.2 GM/DL
MCV RBC AUTO: 84.1 FL
MONOCYTES # BLD AUTO: 0.47 K/UL
MONOCYTES NFR BLD AUTO: 5.2 %
NEUTROPHILS # BLD AUTO: 6.49 K/UL
NEUTROPHILS NFR BLD AUTO: 71.7 %
NONHDLC SERPL-MCNC: 177 MG/DL
PLATELET # BLD AUTO: 329 K/UL
POTASSIUM SERPL-SCNC: 4.2 MMOL/L
PROGEST SERPL-MCNC: 0.1 NG/ML
PROT SERPL-MCNC: 7.1 G/DL
RBC # BLD: 5.8 M/UL
RBC # FLD: 13.3 %
SODIUM SERPL-SCNC: 140 MMOL/L
T3 SERPL-MCNC: 155 NG/DL
T4 FREE SERPL-MCNC: 1.1 NG/DL
TESTOST BND SERPL-MCNC: 2.3 PG/ML
TESTOST SERPL-MCNC: 20.9 NG/DL
THYROPEROXIDASE AB SERPL IA-ACNC: <10 IU/ML
TIBC SERPL-MCNC: 427 UG/DL
TRIGL SERPL-MCNC: 295 MG/DL
TSH SERPL-ACNC: 0.13 UIU/ML
UIBC SERPL-MCNC: 318 UG/DL
VIT B12 SERPL-MCNC: 657 PG/ML
WBC # FLD AUTO: 9.04 K/UL

## 2021-05-10 RX ORDER — LEVOTHYROXINE SODIUM 0.05 MG/1
50 TABLET ORAL
Qty: 30 | Refills: 3 | Status: DISCONTINUED | COMMUNITY
Start: 2019-08-26 | End: 2021-05-10

## 2021-07-15 ENCOUNTER — APPOINTMENT (OUTPATIENT)
Dept: ENDOCRINOLOGY | Facility: CLINIC | Age: 41
End: 2021-07-15
Payer: MEDICAID

## 2021-07-15 VITALS
BODY MASS INDEX: 40.81 KG/M2 | SYSTOLIC BLOOD PRESSURE: 100 MMHG | TEMPERATURE: 97 F | HEIGHT: 67 IN | OXYGEN SATURATION: 97 % | DIASTOLIC BLOOD PRESSURE: 70 MMHG | WEIGHT: 260 LBS | HEART RATE: 82 BPM

## 2021-07-15 PROCEDURE — 99214 OFFICE O/P EST MOD 30 MIN: CPT

## 2021-07-15 NOTE — PHYSICAL EXAM
[Alert] : alert [Well Nourished] : well nourished [No Acute Distress] : no acute distress [Well Developed] : well developed [Normal Sclera/Conjunctiva] : normal sclera/conjunctiva [EOMI] : extra ocular movement intact [No Proptosis] : no proptosis [Normal Oropharynx] : the oropharynx was normal [No Respiratory Distress] : no respiratory distress [No Accessory Muscle Use] : no accessory muscle use [Clear to Auscultation] : lungs were clear to auscultation bilaterally [Normal S1, S2] : normal S1 and S2 [Normal Rate] : heart rate was normal [Regular Rhythm] : with a regular rhythm [No Edema] : no peripheral edema [Pedal Pulses Normal] : the pedal pulses are present [Normal Bowel Sounds] : normal bowel sounds [Not Tender] : non-tender [Not Distended] : not distended [Soft] : abdomen soft [Normal Anterior Cervical Nodes] : no anterior cervical lymphadenopathy [No Spinal Tenderness] : no spinal tenderness [Spine Straight] : spine straight [No Stigmata of Cushings Syndrome] : no stigmata of Cushings Syndrome [Normal Gait] : normal gait [Normal Strength/Tone] : muscle strength and tone were normal [No Rash] : no rash [No Tremors] : no tremors [Oriented x3] : oriented to person, place, and time [Normal Affect] : the affect was normal [Normal Mood] : the mood was normal [Acanthosis Nigricans] : no acanthosis nigricans [de-identified] : Left thyroid nodule palpable, nontender, soft

## 2021-07-15 NOTE — ASSESSMENT
[FreeTextEntry1] : 40F here for f/u of several endocrine issues:\par \par 1) Central hypothyroidism - Will repeat TSH, free T4 and patient LT4 dose was changed at last visit in 5/2021\par \par 2) Pituitary cyst?\par Pituitary labs checked last visit within normal limits, defer further imaging at this time\par \par 3)multinodular thyroid- thyroid US 8/2019 stable.\par Patient here today for follow up thyroid sono, nodules remain stable in size \par \par 4) Obesity \par Patient still participating weight loss coaching program (water sports)\par -Continue with lifestyle modification\par -She declines obesity medication \par - A1c 5.6%\par recommended hepatology eval for fatty liver\par \par 4) Vit D deficiency - ergocalciferol weekly supplement\par \par 5) Prior elevated IGF-1 with negative OGTT - repeat IGF1 wnl \par \par \par Follow up in 1 year for repeat thyroid sono \par \par Seen and discussed with Dr Tolliver\par \par Ori Pérez MD\par Endocrine fellow

## 2021-07-15 NOTE — PROCEDURE
[[employer] e 2008 model, 10-12 MHz frequencies] : multiple real time longitudinal and transverse images were obtained using a high resolution ultrasound with a linear transducer, [employer] e 2008 model, 10-12 MHz frequencies. All measurements will be reported as longitudinal x rakesh-posterior x transverse. [Multinodular Goiter] : multinodular goiter [Left Thyroid] : left [Isoechoic solid component] : with isoechoic solid component [No] : does not have a halo [No calcification] : no calcification [Peripheral vascularity] : peripheral vascularity [Upper] : upper pole there is a  [Mid] : mid pole there is a  [Solid] : solid [Heterogeneous] : heterogenous nodule [Round] : round in shape [Right Thyroid] : right [Lower] : lower pole there is a  [Mixed] : mixed [Hypoechoic] : hypoechoic nodule [Ovoid] : ovoid in shape [Smooth] : smooth [No] : does not have a halo [No calcifications] : no calcifications [No vascularity] : no vascularity [FreeTextEntry1] : 3.71x 1.80x2.01 [FreeTextEntry5] : 3.97x 1.78x2.47 [FreeTextEntry2] : 0.12 [FreeTextEntry3] : 0.06x0.36x 0.61

## 2021-07-15 NOTE — PROCEDURE
[[employer] e 2008 model, 10-12 MHz frequencies] : multiple real time longitudinal and transverse images were obtained using a high resolution ultrasound with a linear transducer, [employer] e 2008 model, 10-12 MHz frequencies. All measurements will be reported as longitudinal x rakesh-posterior x transverse. [Multinodular Goiter] : multinodular goiter [Left Thyroid] : left [Isoechoic solid component] : with isoechoic solid component [No] : does not have a halo [No calcification] : no calcification [Peripheral vascularity] : peripheral vascularity [Upper] : upper pole there is a  [Mid] : mid pole there is a  [Solid] : solid [Heterogeneous] : heterogenous nodule [Round] : round in shape [Right Thyroid] : right [Lower] : lower pole there is a  [Mixed] : mixed [Hypoechoic] : hypoechoic nodule [Ovoid] : ovoid in shape [Smooth] : smooth [No] : does not have a halo [No calcifications] : no calcifications [No vascularity] : no vascularity [FreeTextEntry1] : 3.71x 1.80x2.01 [FreeTextEntry5] : 3.97x 1.78x2.47 [FreeTextEntry2] : 0.12 [FreeTextEntry3] : 0.06x0.36x 0.61

## 2021-07-15 NOTE — PHYSICAL EXAM
[Alert] : alert [Well Nourished] : well nourished [No Acute Distress] : no acute distress [Well Developed] : well developed [Normal Sclera/Conjunctiva] : normal sclera/conjunctiva [EOMI] : extra ocular movement intact [No Proptosis] : no proptosis [Normal Oropharynx] : the oropharynx was normal [No Respiratory Distress] : no respiratory distress [No Accessory Muscle Use] : no accessory muscle use [Clear to Auscultation] : lungs were clear to auscultation bilaterally [Normal S1, S2] : normal S1 and S2 [Normal Rate] : heart rate was normal [Regular Rhythm] : with a regular rhythm [No Edema] : no peripheral edema [Pedal Pulses Normal] : the pedal pulses are present [Normal Bowel Sounds] : normal bowel sounds [Not Tender] : non-tender [Not Distended] : not distended [Soft] : abdomen soft [Normal Anterior Cervical Nodes] : no anterior cervical lymphadenopathy [No Spinal Tenderness] : no spinal tenderness [Spine Straight] : spine straight [No Stigmata of Cushings Syndrome] : no stigmata of Cushings Syndrome [Normal Gait] : normal gait [Normal Strength/Tone] : muscle strength and tone were normal [No Rash] : no rash [No Tremors] : no tremors [Oriented x3] : oriented to person, place, and time [Normal Affect] : the affect was normal [Normal Mood] : the mood was normal [Acanthosis Nigricans] : no acanthosis nigricans [de-identified] : Left thyroid nodule palpable, nontender, soft

## 2021-07-15 NOTE — IMPRESSION
[FreeTextEntry1] : Left sided dominant nodule has been biopsied x2 in the past, and remains stable compared to 8/2019 thyroid sono [FreeTextEntry2] : Recommend follow up thyroid sono in one year

## 2021-07-15 NOTE — HISTORY OF PRESENT ILLNESS
[FreeTextEntry1] : 40 year old female presents for follow up of several endocrine issues.\par Patient follows with Dr Schrader \par \par Had COVID Feb 2021.\par Had surgery December hysteroscopy found polyp, now has Mirena IUD placed. Now no longer having periods.\par Has had difficulty losing weight. Tried various diets on/off. In the past did well with keto diet.\par Recently joined a coaching/weight loss program requesting a list of labs for further assessment.\par History of fatty liver.\par Denies heat/cold intolerance, fatigue more than usual, constipation, diarrhea, or palpitations.\par \par Thyroid nodules:  long standing history of multiple thyroid nodules and dominant left thyroid nodules that has been biopsied in 2005 and 2009 and reported as benign. The patient denies dysphagia, dyspnea or dysphonia. Prior uptake and scan in 2014 showing functional nodule but TFTs showed picture of secondary hypothyroidism and patient was clinically complaining of weight gain and appeared to be hypothyroid and not hyperthyroid. She was initiated on levothyroxine with subsequent dose adjustments.\par Current regimen of levothyroxine -  75 mcg x7days (previously 75mcg x5days and 50mcg x2days). Thyroid dose increase at last Endocrine visit as Free T4 was low normal 1.0.\par She takes in AM on empty stomach.\par She had thyroid US Aug 2019 stable nodules, now presents for follow up.\par \par Prior elevated IGF-1, glucose tolerance checked and was unrevealing.\par Chronic irregular periods, possible mild PCOS, didn't have issues getting pregnant.\par Noted with prediabetes several years ago.\par History of Vit D deficiency. Prescribed the ergocalciferol 50,000 weekly.  \par \par

## 2021-07-19 LAB
T4 FREE SERPL-MCNC: 1 NG/DL
TSH SERPL-ACNC: 0.01 UIU/ML

## 2021-07-21 ENCOUNTER — RESULT REVIEW (OUTPATIENT)
Age: 41
End: 2021-07-21

## 2022-02-09 ENCOUNTER — APPOINTMENT (OUTPATIENT)
Dept: ENDOCRINOLOGY | Facility: CLINIC | Age: 42
End: 2022-02-09
Payer: MEDICAID

## 2022-02-09 VITALS
OXYGEN SATURATION: 98 % | HEIGHT: 67 IN | DIASTOLIC BLOOD PRESSURE: 90 MMHG | BODY MASS INDEX: 40.81 KG/M2 | HEART RATE: 96 BPM | SYSTOLIC BLOOD PRESSURE: 130 MMHG | TEMPERATURE: 97.7 F | WEIGHT: 260 LBS

## 2022-02-09 PROCEDURE — 99214 OFFICE O/P EST MOD 30 MIN: CPT

## 2022-02-09 NOTE — HISTORY OF PRESENT ILLNESS
[FreeTextEntry1] : 41 year old female presents for follow up of several endocrine issues.\par Had surgery December hysteroscopy found polyp, now has Mirena IUD placed. Now no longer having periods.\par Difficulty with weight loss, weight same as last visit. Tried various diets on/off. In the past did well with keto diet.\par History of fatty liver.\par Denies heat/cold intolerance, constipation, diarrhea, or palpitations. Some chronic fatigue, not worse.\par \par Thyroid nodules:  long standing history of multiple thyroid nodules and dominant left thyroid nodules that has been biopsied in 2005 and 2009 and reported as benign. Had ultrasound July 2021 stable size of nodule.\par The patient denies dysphagia, dyspnea or dysphonia. Prior uptake and scan in 2014 showing functional nodule but TFTs showed picture of secondary hypothyroidism and patient was clinically complaining of weight gain and appeared to be hypothyroid and not hyperthyroid. She was initiated on levothyroxine with subsequent dose adjustments.\par Current regimen of levothyroxine -  75 mcg daily\par She takes in AM on empty stomach.\par \par Prior elevated IGF-1, glucose tolerance checked and was unrevealing.\par Chronic irregular periods, possible mild PCOS, didn't have issues getting pregnant.\par Noted with prediabetes several years ago.\par History of Vit D deficiency. Prescribed the ergocalciferol 50,000 weekly which she reports she is now taking.\par Previously had seen a functional medicine doctor prescribed supplements, not taking these now.\par \par

## 2022-02-09 NOTE — PHYSICAL EXAM
[Alert] : alert [Well Nourished] : well nourished [No Acute Distress] : no acute distress [Well Developed] : well developed [Normal Sclera/Conjunctiva] : normal sclera/conjunctiva [EOMI] : extra ocular movement intact [No Proptosis] : no proptosis [Normal Oropharynx] : the oropharynx was normal [Thyroid Not Enlarged] : the thyroid was not enlarged [No Thyroid Nodules] : no palpable thyroid nodules [No Respiratory Distress] : no respiratory distress [No Accessory Muscle Use] : no accessory muscle use [Clear to Auscultation] : lungs were clear to auscultation bilaterally [Normal S1, S2] : normal S1 and S2 [Normal Rate] : heart rate was normal [Regular Rhythm] : with a regular rhythm [No Edema] : no peripheral edema [Pedal Pulses Normal] : the pedal pulses are present [Normal Bowel Sounds] : normal bowel sounds [Not Tender] : non-tender [Not Distended] : not distended [Soft] : abdomen soft [Normal Anterior Cervical Nodes] : no anterior cervical lymphadenopathy [No Spinal Tenderness] : no spinal tenderness [Spine Straight] : spine straight [No Stigmata of Cushings Syndrome] : no stigmata of Cushings Syndrome [Normal Gait] : normal gait [Normal Strength/Tone] : muscle strength and tone were normal [No Rash] : no rash [No Tremors] : no tremors [Oriented x3] : oriented to person, place, and time [Normal Affect] : the affect was normal [Normal Mood] : the mood was normal [Acanthosis Nigricans] : no acanthosis nigricans

## 2022-02-09 NOTE — REVIEW OF SYSTEMS
[Negative] : Heme/Lymph [As Noted in HPI] : as noted in HPI [Fatigue] : fatigue [Recent Weight Gain (___ Lbs)] : no recent weight gain [Recent Weight Loss (___ Lbs)] : no recent weight loss [FreeTextEntry2] : difficulty losing weight

## 2022-02-16 ENCOUNTER — NON-APPOINTMENT (OUTPATIENT)
Age: 42
End: 2022-02-16

## 2022-02-16 DIAGNOSIS — E66.9 OBESITY, UNSPECIFIED: ICD-10-CM

## 2022-02-16 LAB
25(OH)D3 SERPL-MCNC: 14.1 NG/ML
ALBUMIN SERPL ELPH-MCNC: 4.7 G/DL
ALP BLD-CCNC: 140 U/L
ALT SERPL-CCNC: 76 U/L
ANION GAP SERPL CALC-SCNC: 13 MMOL/L
AST SERPL-CCNC: 48 U/L
BASOPHILS # BLD AUTO: 0.06 K/UL
BASOPHILS NFR BLD AUTO: 0.7 %
BILIRUB SERPL-MCNC: 0.5 MG/DL
BUN SERPL-MCNC: 15 MG/DL
CALCIUM SERPL-MCNC: 10.4 MG/DL
CHLORIDE SERPL-SCNC: 104 MMOL/L
CHOLEST SERPL-MCNC: 221 MG/DL
CO2 SERPL-SCNC: 24 MMOL/L
CREAT SERPL-MCNC: 0.71 MG/DL
EOSINOPHIL # BLD AUTO: 0.25 K/UL
EOSINOPHIL NFR BLD AUTO: 3.1 %
ESTIMATED AVERAGE GLUCOSE: 123 MG/DL
GLUCOSE SERPL-MCNC: 105 MG/DL
HBA1C MFR BLD HPLC: 5.9 %
HCT VFR BLD CALC: 48.5 %
HDLC SERPL-MCNC: 28 MG/DL
HGB BLD-MCNC: 15.5 G/DL
IMM GRANULOCYTES NFR BLD AUTO: 0.2 %
LDLC SERPL CALC-MCNC: 126 MG/DL
LYMPHOCYTES # BLD AUTO: 2.19 K/UL
LYMPHOCYTES NFR BLD AUTO: 26.8 %
MAN DIFF?: NORMAL
MCHC RBC-ENTMCNC: 27 PG
MCHC RBC-ENTMCNC: 32 GM/DL
MCV RBC AUTO: 84.5 FL
MONOCYTES # BLD AUTO: 0.46 K/UL
MONOCYTES NFR BLD AUTO: 5.6 %
NEUTROPHILS # BLD AUTO: 5.2 K/UL
NEUTROPHILS NFR BLD AUTO: 63.6 %
NONHDLC SERPL-MCNC: 193 MG/DL
PLATELET # BLD AUTO: 321 K/UL
POTASSIUM SERPL-SCNC: 4.7 MMOL/L
PROT SERPL-MCNC: 7.1 G/DL
RBC # BLD: 5.74 M/UL
RBC # FLD: 13.7 %
SODIUM SERPL-SCNC: 141 MMOL/L
T4 FREE SERPL-MCNC: 1.1 NG/DL
TRIGL SERPL-MCNC: 336 MG/DL
TSH SERPL-ACNC: 0.05 UIU/ML
WBC # FLD AUTO: 8.18 K/UL

## 2022-02-25 RX ORDER — PEN NEEDLE, DIABETIC 29 G X1/2"
32G X 4 MM NEEDLE, DISPOSABLE MISCELLANEOUS
Qty: 1 | Refills: 3 | Status: DISCONTINUED | COMMUNITY
Start: 2022-02-16 | End: 2022-02-25

## 2022-02-25 RX ORDER — SEMAGLUTIDE 1.34 MG/ML
2 INJECTION, SOLUTION SUBCUTANEOUS
Qty: 3 | Refills: 0 | Status: DISCONTINUED | COMMUNITY
Start: 2022-02-16 | End: 2022-02-25

## 2022-03-14 ENCOUNTER — RX CHANGE (OUTPATIENT)
Age: 42
End: 2022-03-14

## 2022-03-24 NOTE — H&P PST ADULT - RESPIRATORY
detailed exam [employer] Counseling:  I discussed with the patient the risks of mycophenolate mofetil including but not limited to infection/immunosuppression, GI upset, hypokalemia, hypercholesterolemia, bone marrow suppression, lymphoproliferative disorders, malignancy, GI ulceration/bleed/perforation, colitis, interstitial lung disease, kidney failure, progressive multifocal leukoencephalopathy, and birth defects.  The patient understands that monitoring is required including a baseline creatinine and regular CBC testing. In addition, patient must alert us immediately if symptoms of infection or other concerning signs are noted.

## 2022-03-28 NOTE — H&P PST ADULT - NEUROLOGICAL
LFA and hand splinted by resident. Pt tolerated well. Resident provided instructions on splint wear, hygiene, and pain medication with iPad . Pt verbalized understanding.       Amos Elder RN  03/28/22 1267 details… detailed exam

## 2022-07-14 ENCOUNTER — APPOINTMENT (OUTPATIENT)
Dept: ENDOCRINOLOGY | Facility: CLINIC | Age: 42
End: 2022-07-14

## 2022-07-14 VITALS
DIASTOLIC BLOOD PRESSURE: 78 MMHG | WEIGHT: 275 LBS | OXYGEN SATURATION: 98 % | TEMPERATURE: 97.4 F | SYSTOLIC BLOOD PRESSURE: 118 MMHG | HEIGHT: 67 IN | HEART RATE: 79 BPM | BODY MASS INDEX: 43.16 KG/M2

## 2022-07-14 PROCEDURE — 76536 US EXAM OF HEAD AND NECK: CPT

## 2022-07-14 PROCEDURE — 99213 OFFICE O/P EST LOW 20 MIN: CPT | Mod: 25

## 2022-07-14 NOTE — ASSESSMENT
[FreeTextEntry1] : 41 year old female here for follow up of thyroid nodules. \par In office thyroid ultrasound today showing stability in the right sided thyroid nodules which are sub-cm and also showing stability in the left sided thyroid nodule. \par ( Previously FNA in 2005 and 2009 for the left nodule was benign)\par -Will follow up in one year

## 2022-07-14 NOTE — PROCEDURE
[[employer] e 2008 model, 10-12 MHz frequencies] : multiple real time longitudinal and transverse images were obtained using a high resolution ultrasound with a linear transducer, [employer] e 2008 model, 10-12 MHz frequencies. All measurements will be reported as longitudinal x rakesh-posterior x transverse. [Thyroid Nodule] : thyroid nodule [] : a homogeneous parenchyma [Upper] : upper pole there is a  [Solid] : solid [No] : does not have a halo [No calcification] : no calcification [Right Thyroid] : right [Lower] : lower pole there is a  [Heterogeneous] : heterogenous nodule [Left Thyroid] : left [Mid] : mid pole there is a  [Mixed] : mixed [Ovoid] : ovoid in shape [Smooth] : smooth [No] : does not have a halo [No calcifications] : no calcifications [Peripheral vascularity] : peripheral vascularity [2] : 2 [No abnormal lymph nodes are seen.] : no abnormal lymph nodes are seen [FreeTextEntry1] : 3.78 x 1.26 x 1.42 [FreeTextEntry5] : 5.32 x 2.32 x 2.62 [FreeTextEntry2] : 0.24 [FreeTextEntry3] : 3.36 x 1.81 x 2.29

## 2022-07-14 NOTE — PROCEDURE
[[employer] e 2008 model, 10-12 MHz frequencies] : multiple real time longitudinal and transverse images were obtained using a high resolution ultrasound with a linear transducer, [employer] e 2008 model, 10-12 MHz frequencies. All measurements will be reported as longitudinal x rakesh-posterior x transverse. [Thyroid Nodule] : thyroid nodule [] : a homogeneous parenchyma [Upper] : upper pole there is a  [Solid] : solid [No] : does not have a halo [No calcification] : no calcification [Right Thyroid] : right [Lower] : lower pole there is a  [Heterogeneous] : heterogenous nodule [Left Thyroid] : left [Mid] : mid pole there is a  [Mixed] : mixed [Ovoid] : ovoid in shape [Smooth] : smooth [No] : does not have a halo [No calcifications] : no calcifications [Peripheral vascularity] : peripheral vascularity [2] : 2 [No abnormal lymph nodes are seen.] : no abnormal lymph nodes are seen [FreeTextEntry1] : 3.78 x 1.26 x 1.42 [FreeTextEntry5] : 5.32 x 2.32 x 2.62 [FreeTextEntry2] : 0.24 [FreeTextEntry3] : 3.36 x 1.81 x 2.29

## 2022-07-14 NOTE — IMPRESSION
[FreeTextEntry1] : Left sided dominant nodule displaying interval stability  [FreeTextEntry2] : Follow up on ultrasound in one year

## 2022-07-14 NOTE — HISTORY OF PRESENT ILLNESS
[FreeTextEntry1] : 41 year old female with history of thyroid nodules here for follow up \par \par -She has a long standing history of multiple thyroid nodules and her left sided dominant nodule has been biopsied in 2005 and 2009. ( Both times benign) \par \par -No family history of thyroid cancer and no radiation exposure to head or neck area \par -She takes levothyroxine 50 mcg daily \par \par -No compressive symptoms

## 2022-07-27 ENCOUNTER — RESULT REVIEW (OUTPATIENT)
Age: 42
End: 2022-07-27

## 2022-08-10 ENCOUNTER — APPOINTMENT (OUTPATIENT)
Dept: ENDOCRINOLOGY | Facility: CLINIC | Age: 42
End: 2022-08-10

## 2022-08-10 VITALS
WEIGHT: 275 LBS | HEIGHT: 67 IN | SYSTOLIC BLOOD PRESSURE: 126 MMHG | BODY MASS INDEX: 43.16 KG/M2 | HEART RATE: 83 BPM | OXYGEN SATURATION: 98 % | DIASTOLIC BLOOD PRESSURE: 78 MMHG | TEMPERATURE: 97.2 F

## 2022-08-10 DIAGNOSIS — Z00.00 ENCOUNTER FOR GENERAL ADULT MEDICAL EXAMINATION W/OUT ABNORMAL FINDINGS: ICD-10-CM

## 2022-08-10 PROCEDURE — 99214 OFFICE O/P EST MOD 30 MIN: CPT

## 2022-08-10 NOTE — ASSESSMENT
[FreeTextEntry1] : 41F here for f/u of several endocrine issues:\par \par 1) Central hypothyroidism - Check TSH, free T4 once on dose consistently - in 4- 6  weeks (may need to obtain another supply from pharmacy vs find in house)\par continue for now levothyroxine 88 mcg daily\par \par 2) Pituitary cyst?\par Pituitary labs checked last visit within normal limits, defer further imaging at this time\par Prior elevated IGF-1 with negative OGTT \par no additional workup at this time\par \par 3)multinodular thyroid- thyroid US Jult 2021 stable.\par follow up ultrasound with Dr. Tolliver done July 2022, follow up 1 year\par \par 4) Obesity \par -Continue with lifestyle modification, started seeing nutritionist\par -Ozempic not covered despite attempts\par trend HbA1c / prediabetes\par may resume metformin 500mg BID (can consider metformin ER)\par \par 4) Vit D deficiency - ergocalciferol weekly supplement\par repeat 25OHD\par \par 5) Fatty liver\par calculated Fib-4 today 0.6, low risk\par \par F/u 6 months

## 2022-08-10 NOTE — REVIEW OF SYSTEMS
[As Noted in HPI] : as noted in HPI [Fatigue] : fatigue [Negative] : Heme/Lymph [Recent Weight Gain (___ Lbs)] : no recent weight gain [Recent Weight Loss (___ Lbs)] : no recent weight loss [FreeTextEntry2] : difficulty losing weight

## 2022-08-23 ENCOUNTER — NON-APPOINTMENT (OUTPATIENT)
Age: 42
End: 2022-08-23

## 2022-08-23 LAB
25(OH)D3 SERPL-MCNC: 22.7 NG/ML
ALBUMIN SERPL ELPH-MCNC: 4.4 G/DL
ALP BLD-CCNC: 109 U/L
ALT SERPL-CCNC: 54 U/L
ANION GAP SERPL CALC-SCNC: 11 MMOL/L
AST SERPL-CCNC: 28 U/L
BASOPHILS # BLD AUTO: 0.03 K/UL
BASOPHILS NFR BLD AUTO: 0.4 %
BILIRUB SERPL-MCNC: 0.4 MG/DL
BUN SERPL-MCNC: 16 MG/DL
CALCIUM SERPL-MCNC: 10.2 MG/DL
CHLORIDE SERPL-SCNC: 107 MMOL/L
CHOLEST SERPL-MCNC: 197 MG/DL
CO2 SERPL-SCNC: 24 MMOL/L
CREAT SERPL-MCNC: 0.72 MG/DL
CRP SERPL-MCNC: 3 MG/L
EGFR: 107 ML/MIN/1.73M2
EOSINOPHIL # BLD AUTO: 0.03 K/UL
EOSINOPHIL NFR BLD AUTO: 0.4 %
ESTIMATED AVERAGE GLUCOSE: 114 MG/DL
FERRITIN SERPL-MCNC: 124 NG/ML
GLUCOSE SERPL-MCNC: 102 MG/DL
HBA1C MFR BLD HPLC: 5.6 %
HCT VFR BLD CALC: 46.8 %
HDLC SERPL-MCNC: 26 MG/DL
HGB BLD-MCNC: 15.3 G/DL
IMM GRANULOCYTES NFR BLD AUTO: 0.1 %
INSULIN SERPL-MCNC: 52.9 UU/ML
IRON SATN MFR SERPL: 20 %
IRON SERPL-MCNC: 84 UG/DL
LDLC SERPL CALC-MCNC: 119 MG/DL
LYMPHOCYTES # BLD AUTO: 2.48 K/UL
LYMPHOCYTES NFR BLD AUTO: 31.6 %
MAGNESIUM SERPL-MCNC: 1.8 MG/DL
MAN DIFF?: NORMAL
MCHC RBC-ENTMCNC: 27.6 PG
MCHC RBC-ENTMCNC: 32.7 GM/DL
MCV RBC AUTO: 84.3 FL
MONOCYTES # BLD AUTO: 0.48 K/UL
MONOCYTES NFR BLD AUTO: 6.1 %
NEUTROPHILS # BLD AUTO: 4.81 K/UL
NEUTROPHILS NFR BLD AUTO: 61.4 %
NONHDLC SERPL-MCNC: 171 MG/DL
PLATELET # BLD AUTO: 308 K/UL
POTASSIUM SERPL-SCNC: 4.6 MMOL/L
PROT SERPL-MCNC: 6.8 G/DL
RBC # BLD: 5.55 M/UL
RBC # FLD: 13.3 %
SODIUM SERPL-SCNC: 142 MMOL/L
T3 SERPL-MCNC: 161 NG/DL
T3FREE SERPL-MCNC: 4.02 PG/ML
T4 FREE SERPL-MCNC: 1.2 NG/DL
TIBC SERPL-MCNC: 414 UG/DL
TRANSFERRIN SERPL-MCNC: 346 MG/DL
TRIGL SERPL-MCNC: 258 MG/DL
TSH SERPL-ACNC: 0.01 UIU/ML
UIBC SERPL-MCNC: 331 UG/DL
VIT B12 SERPL-MCNC: 503 PG/ML
WBC # FLD AUTO: 7.84 K/UL

## 2022-10-03 ENCOUNTER — RX RENEWAL (OUTPATIENT)
Age: 42
End: 2022-10-03

## 2022-11-23 ENCOUNTER — RX RENEWAL (OUTPATIENT)
Age: 42
End: 2022-11-23

## 2023-02-08 ENCOUNTER — APPOINTMENT (OUTPATIENT)
Dept: ENDOCRINOLOGY | Facility: CLINIC | Age: 43
End: 2023-02-08
Payer: MEDICAID

## 2023-02-08 VITALS
BODY MASS INDEX: 39.71 KG/M2 | DIASTOLIC BLOOD PRESSURE: 76 MMHG | OXYGEN SATURATION: 97 % | SYSTOLIC BLOOD PRESSURE: 126 MMHG | HEART RATE: 87 BPM | RESPIRATION RATE: 14 BRPM | HEIGHT: 67 IN | WEIGHT: 253 LBS

## 2023-02-08 PROCEDURE — 99214 OFFICE O/P EST MOD 30 MIN: CPT

## 2023-02-08 RX ORDER — METFORMIN HYDROCHLORIDE 500 MG/1
500 TABLET, COATED ORAL
Qty: 60 | Refills: 5 | Status: DISCONTINUED | COMMUNITY
Start: 2022-02-25 | End: 2023-02-08

## 2023-02-08 RX ORDER — ATORVASTATIN CALCIUM 10 MG/1
10 TABLET, FILM COATED ORAL
Qty: 30 | Refills: 3 | Status: ACTIVE | COMMUNITY
Start: 2023-02-08 | End: 1900-01-01

## 2023-02-08 NOTE — ASSESSMENT
[FreeTextEntry1] : 42F here for f/u of several endocrine issues:\par \par 1) Central hypothyroidism \par - Current regimen of levothyroxine 88 mcg daily, occasionally misses a dose (misses about 3 doses per month)\par - Patient had labs done with  nutritionist on 12/6/2022: Free T4 1.0 (TSH low in setting of central hypothyroidism: TSH 0.013)\par - continue for now levothyroxine 88 mcg daily\par \par 2) Pituitary cyst?\par - Pituitary labs checked last visit within normal limits, defer further imaging at this time\par - Prior elevated IGF-1 with negative OGTT \par - no additional workup at this time\par \par 3 Multinodular thyroid\par - thyroid US July 2022 stable.\par - follow up ultrasound with Dr. Tolliver July 2023 for repeat US\par \par 4) Obesity \par - Continue with lifestyle modification\par - Recent weight loss, BMI went from 43.07 ---> 39.63\par - Ozempic not covered despite attempts, will attempt to send Wegovy, plan to start at 0.25 mg once a week and titrate up dosing as tolerated. \par - Not currently taking metformin\par - trend HbA1c / prediabetes: 11/2022 A1C: 5.5%\par \par 4) Vit D deficiency \par - 11/2022: Vit D 16.4\par - not on Vitamin D supplement currently, will resend ergocalciferol weekly supplement\par \par 5) Fatty liver\par calculated Fib-4 0.6, low risk, previous labs:\par bili 0.7\par alt 38\par ast 25\par alk phos 114\par repeat 25OHD\par - recent labs from 11/2022 with ALT, AST, Alk phos, and bilirubin wnl \par \par F/u in 3 months and f/u in 7/2023 for repeat thyroid US with Dr. Tolliver\par \par Patient seen with Dr. Schrader\par \par Minal Jama PA-C\par [facility]

## 2023-02-08 NOTE — PHYSICAL EXAM
[Alert] : alert [No Acute Distress] : no acute distress [Well Developed] : well developed [Normal Sclera/Conjunctiva] : normal sclera/conjunctiva [No Proptosis] : no proptosis [Thyroid Not Enlarged] : the thyroid was not enlarged [No Thyroid Nodules] : no palpable thyroid nodules [No Respiratory Distress] : no respiratory distress [No Accessory Muscle Use] : no accessory muscle use [Clear to Auscultation] : lungs were clear to auscultation bilaterally [Normal S1, S2] : normal S1 and S2 [Normal Rate] : heart rate was normal [Regular Rhythm] : with a regular rhythm [No Edema] : no peripheral edema [Not Tender] : non-tender [Not Distended] : not distended [Soft] : abdomen soft [Spine Straight] : spine straight [Normal Gait] : normal gait [No Tremors] : no tremors [Oriented x3] : oriented to person, place, and time [Normal Affect] : the affect was normal [Obese] : obese [No Neck Mass] : no neck mass was observed [No Stigmata of Cushings Syndrome] : no stigmata of Cushings Syndrome [Acanthosis Nigricans] : no acanthosis nigricans

## 2023-02-08 NOTE — END OF VISIT
[FreeTextEntry3] : Continue levothyroxine, resume use of ergocalciferol 50,000 units weekly for D deficiency, obesity prescribe Wegovy to check coverage.

## 2023-02-08 NOTE — HISTORY OF PRESENT ILLNESS
[FreeTextEntry1] : 42 year old female presents for follow up of several endocrine issues.\par \par Central hypothyroidism: \par Current regimen of levothyroxine -  88 mcg daily, occasionally misses a dose (misses about 3 doses per month)\par Denies fatigue, cold or heat intolerance. Reports had GI symptoms previously, now no longer with constipation or diarrhea. Denies tremors or heart palpitations. Denies increased hair loss. Had surgery December 2021 hysteroscopy found polyp, now has Mirena IUD placed. Now no longer having periods.\par \par Thyroid nodules:  long standing history of multiple thyroid nodules and dominant left thyroid nodules that has been biopsied in 2005 and 2009 and reported as benign. Had ultrasound July 2021 and July 2022 stable size of nodule. Prior uptake and scan in 2014 showing functional nodule but TFTs showed picture of secondary hypothyroidism and patient was clinically complaining of weight gain and appeared to be hypothyroid and not hyperthyroid. She was initiated on levothyroxine with subsequent dose adjustments. Recent thyroid US with Dr. Tolliver 7/2022, stable one year follow up recommended. Denies compressive symptoms: no neck pain, neck swelling, dysphagia, or shortness of breath. \par Has right eye erythema, saw optho, on prednisone ggt. Denies proptosis or blurry vision. \par \par Obesity/preDM - Ozempic not covered\par Difficulty with weight loss. Tried various diets on/off. In the past did well with keto diet.\par Tried metformin 500 BID but stopped both metformin and dairy for GI side effects - not sure if metformin implicated by symptoms improved after stopped both. Started seeing a nutritionist, no longer seeing\par Patient reports never restarting metformin after last visit \par Exercise: "on and off"\par Diet: recently travelled to [place] so diet has been"not as good."\par \par Prior elevated IGF-1, glucose tolerance checked and was unrevealing.\par Previously chronic irregular periods, possible mild PCOS, didn't have issues getting pregnant.\par Noted with prediabetes several years ago. Most recent A1c 11/2022 5.5%\par Reports hair on chest and chin, not worsening, reports acne but not worsening \par \par History of Vit D deficiency. Prescribed the ergocalciferol 50,000 weekly which she stopped taking a few months ago. \par Previously had seen a functional medicine doctor prescribed supplements, not taking these now.\par \par History of fatty liver.\par \par Patient had recent labs done with nutritionist: 11/11/2022\par Cr 0.8\par GFFR: 94\par triglycerides: 285\par Total cholesterol 197\par HDL 28\par VLDL 57\par A1C: 5.5%\par TSH 0.011\par Free t4 1.2\par Vit D 16.4\par \par Labs from 12/6/2022 \par TSH 0.013\par Free T4 1.0

## 2023-02-08 NOTE — REVIEW OF SYSTEMS
[As Noted in HPI] : as noted in HPI [Negative] : Musculoskeletal [Fatigue] : no fatigue [Eye Pain] : no pain [Blurred Vision] : no blurred vision [Dysphagia] : no dysphagia [Neck Pain] : no neck pain [Dysphonia] : no dysphonia [Chest Pain] : no chest pain [Palpitations] : no palpitations [Shortness Of Breath] : no shortness of breath [Nausea] : no nausea [Constipation] : no constipation [Abdominal Pain] : no abdominal pain [Vomiting] : no vomiting [Diarrhea] : no diarrhea [Polyuria] : no polyuria [Hair Loss] : no hair loss [Tremors] : no tremors [Pain/Numbness of Digits] : no pain/numbness of digits [Polydipsia] : no polydipsia [Cold Intolerance] : no cold intolerance [Heat Intolerance] : no heat intolerance [FreeTextEntry2] : difficulty losing weight

## 2023-05-31 ENCOUNTER — APPOINTMENT (OUTPATIENT)
Dept: ENDOCRINOLOGY | Facility: CLINIC | Age: 43
End: 2023-05-31
Payer: MEDICAID

## 2023-05-31 VITALS
SYSTOLIC BLOOD PRESSURE: 126 MMHG | DIASTOLIC BLOOD PRESSURE: 80 MMHG | HEIGHT: 67.32 IN | WEIGHT: 266 LBS | BODY MASS INDEX: 41.26 KG/M2 | OXYGEN SATURATION: 97 % | HEART RATE: 92 BPM

## 2023-05-31 DIAGNOSIS — R68.89 OTHER GENERAL SYMPTOMS AND SIGNS: ICD-10-CM

## 2023-05-31 PROCEDURE — 99214 OFFICE O/P EST MOD 30 MIN: CPT

## 2023-05-31 RX ORDER — SEMAGLUTIDE 0.25 MG/.5ML
0.25 INJECTION, SOLUTION SUBCUTANEOUS
Qty: 1 | Refills: 1 | Status: ACTIVE | COMMUNITY
Start: 2023-02-08 | End: 1900-01-01

## 2023-05-31 NOTE — ASSESSMENT
[FreeTextEntry1] : 42F here for f/u of several endocrine issues:\par \par 1) Central hypothyroidism \par - Current regimen of levothyroxine 88 mcg daily, occasionally misses a dose (misses about 3 doses per month)\par - check TFTs\par \par 2) Pituitary cyst?\par - Pituitary labs checked last visit within normal limits, defer further imaging at this time\par - Prior elevated IGF-1 with negative OGTT \par - no additional workup at this time\par \par 3 Multinodular thyroid\par - thyroid US July 2022 stable.\par - follow up ultrasound with Dr. Tolliver July 2023 for repeat US\par \par 4) Obesity, history of prediabetes\par - Continue with lifestyle modification\par - Recent weight loss, BMI went from 43.07 ---> 39.63 --> 41.3\par - Ozempic not covered despite attempts, will attempt to send Wegovy, plan to start at 0.25 mg once a week and titrate up dosing as tolerated. \par - Increase metformin to 500mg BID with meals\par - trend HbA1c / prediabetes: 11/2022 A1C: 5.5%, repeat HbA1c\par \par 4) Vit D deficiency \par - 11/2022: Vit D 16.4\par continue ergocalciferol weekly supplement\par \par 5) NAFLD\par calculated Fib-4 0.6, low risk, previous labs:\par check labs\par lifestyle modification\par adding Wegovy\par \par F/u in 3 months and f/u in 7/2023 for repeat thyroid US with Dr. Tolliver\par \par

## 2023-05-31 NOTE — REVIEW OF SYSTEMS
[As Noted in HPI] : as noted in HPI [Negative] : Heme/Lymph [Fatigue] : no fatigue [Eye Pain] : no pain [Blurred Vision] : no blurred vision [Dysphagia] : no dysphagia [Neck Pain] : no neck pain [Dysphonia] : no dysphonia [Chest Pain] : no chest pain [Palpitations] : no palpitations [Shortness Of Breath] : no shortness of breath [Nausea] : no nausea [Constipation] : no constipation [Abdominal Pain] : no abdominal pain [Vomiting] : no vomiting [Diarrhea] : no diarrhea [Polyuria] : no polyuria [Hair Loss] : no hair loss [Tremors] : no tremors [Pain/Numbness of Digits] : no pain/numbness of digits [Polydipsia] : no polydipsia [Cold Intolerance] : no cold intolerance [Heat Intolerance] : no heat intolerance [FreeTextEntry2] : difficulty losing weight

## 2023-05-31 NOTE — PHYSICAL EXAM
[Alert] : alert [Obese] : obese [No Acute Distress] : no acute distress [Well Developed] : well developed [Normal Sclera/Conjunctiva] : normal sclera/conjunctiva [No Proptosis] : no proptosis [No Neck Mass] : no neck mass was observed [Thyroid Not Enlarged] : the thyroid was not enlarged [No Thyroid Nodules] : no palpable thyroid nodules [No Respiratory Distress] : no respiratory distress [No Accessory Muscle Use] : no accessory muscle use [Clear to Auscultation] : lungs were clear to auscultation bilaterally [Normal S1, S2] : normal S1 and S2 [Normal Rate] : heart rate was normal [Regular Rhythm] : with a regular rhythm [No Edema] : no peripheral edema [Not Tender] : non-tender [Not Distended] : not distended [Soft] : abdomen soft [Spine Straight] : spine straight [No Stigmata of Cushings Syndrome] : no stigmata of Cushings Syndrome [Normal Gait] : normal gait [No Tremors] : no tremors [Oriented x3] : oriented to person, place, and time [Normal Affect] : the affect was normal [Acanthosis Nigricans] : no acanthosis nigricans

## 2023-05-31 NOTE — HISTORY OF PRESENT ILLNESS
[FreeTextEntry1] : 42 year old female presents for follow up of several endocrine issues.\par Central hypothyroidism: \par Current regimen of levothyroxine -  88 mcg daily, occasionally misses a dose (misses about 3 doses per month)\par Denies fatigue, cold or heat intolerance. Reports had GI symptoms previously, now no longer with constipation or diarrhea. Denies tremors or heart palpitations. Denies increased hair loss. Had surgery December 2021 hysteroscopy found polyp, now has Mirena IUD placed. Now no longer having periods.\par \par Thyroid nodules:  long standing history of multiple thyroid nodules and dominant left thyroid nodules that has been biopsied in 2005 and 2009 and reported as benign. Had ultrasound July 2021 and July 2022 stable size of nodule. Prior uptake and scan in 2014 showing functional nodule but TFTs showed picture of secondary hypothyroidism and patient was clinically complaining of weight gain and appeared to be hypothyroid and not hyperthyroid. She was initiated on levothyroxine with subsequent dose adjustments. Recent thyroid US with Dr. Tolliver 7/2022, stable one year follow up recommended. Denies compressive symptoms: no neck pain, neck swelling, dysphagia, or shortness of breath. \par Has right eye erythema, saw optho, on prednisone ggt. Denies proptosis or blurry vision. \par \par Obesity/preDM - Ozempic not covered\par Difficulty with weight loss. Tried various diets on/off. In the past did well with keto diet.\par Taking metformin 500mg once daily, misses some doses\par Exercise: "on and off"\par \par Prior elevated IGF-1, glucose tolerance checked and was unrevealing.\par Previously chronic irregular periods, possible mild PCOS, didn't have issues getting pregnant.\par Noted with prediabetes several years ago. Most recent A1c 11/2022 5.5%\par Reports hair on chest and chin, not worsening, reports acne but not worsening \par \par History of Vit D deficiency. Prescribed the ergocalciferol 50,000 weekly which she stopped taking a few months ago. \par Previously had seen a functional medicine doctor prescribed supplements, not taking these now.\par \par History of NAFLD\par \par Patient had recent labs done with nutritionist: 11/11/2022\par Cr 0.8\par GFFR: 94\par triglycerides: 285\par Total cholesterol 197\par HDL 28\par VLDL 57\par A1C: 5.5%\par TSH 0.011\par Free t4 1.2\par Vit D 16.4\par \par Labs from 12/6/2022 \par TSH 0.013\par Free T4 1.0

## 2023-06-01 ENCOUNTER — NON-APPOINTMENT (OUTPATIENT)
Age: 43
End: 2023-06-01

## 2023-06-01 LAB
25(OH)D3 SERPL-MCNC: 20.8 NG/ML
ALBUMIN SERPL ELPH-MCNC: 4.5 G/DL
ALP BLD-CCNC: 128 U/L
ALT SERPL-CCNC: 33 U/L
ANION GAP SERPL CALC-SCNC: 16 MMOL/L
AST SERPL-CCNC: 22 U/L
BILIRUB SERPL-MCNC: 0.4 MG/DL
BUN SERPL-MCNC: 14 MG/DL
CALCIUM SERPL-MCNC: 10.1 MG/DL
CHLORIDE SERPL-SCNC: 101 MMOL/L
CHOLEST SERPL-MCNC: 142 MG/DL
CO2 SERPL-SCNC: 23 MMOL/L
CREAT SERPL-MCNC: 0.67 MG/DL
EGFR: 111 ML/MIN/1.73M2
ESTIMATED AVERAGE GLUCOSE: 126 MG/DL
GLUCOSE SERPL-MCNC: 95 MG/DL
HBA1C MFR BLD HPLC: 6 %
HDLC SERPL-MCNC: 27 MG/DL
LDLC SERPL CALC-MCNC: 52 MG/DL
NONHDLC SERPL-MCNC: 114 MG/DL
POTASSIUM SERPL-SCNC: 4.7 MMOL/L
PROT SERPL-MCNC: 7 G/DL
SODIUM SERPL-SCNC: 140 MMOL/L
T4 FREE SERPL-MCNC: 1.3 NG/DL
TRIGL SERPL-MCNC: 313 MG/DL
TSH SERPL-ACNC: 0.01 UIU/ML

## 2023-06-02 ENCOUNTER — NON-APPOINTMENT (OUTPATIENT)
Age: 43
End: 2023-06-02

## 2023-07-10 ENCOUNTER — RX RENEWAL (OUTPATIENT)
Age: 43
End: 2023-07-10

## 2023-07-19 ENCOUNTER — APPOINTMENT (OUTPATIENT)
Dept: ENDOCRINOLOGY | Facility: CLINIC | Age: 43
End: 2023-07-19
Payer: MEDICAID

## 2023-07-19 VITALS
OXYGEN SATURATION: 97 % | BODY MASS INDEX: 40.33 KG/M2 | SYSTOLIC BLOOD PRESSURE: 120 MMHG | WEIGHT: 260 LBS | HEART RATE: 66 BPM | DIASTOLIC BLOOD PRESSURE: 72 MMHG | HEIGHT: 67.32 IN

## 2023-07-19 PROCEDURE — 99213 OFFICE O/P EST LOW 20 MIN: CPT | Mod: 25

## 2023-07-19 PROCEDURE — 76536 US EXAM OF HEAD AND NECK: CPT

## 2023-07-19 NOTE — ASSESSMENT
[FreeTextEntry1] : 41 year old female here for follow up of thyroid nodules. \par \par In office thyroid ultrasound today showing stability in the right sided thyroid nodules which are sub-cm and also showing stability in the left sided thyroid nodule. \par ( Previously FNA in 2005 and 2009 for the left nodule was benign)\par \par -Will follow up in one year

## 2023-07-19 NOTE — PROCEDURE
[[employer] e 2008 model, 10-12 MHz frequencies] : multiple real time longitudinal and transverse images were obtained using a high resolution ultrasound with a linear transducer, [employer] e 2008 model, 10-12 MHz frequencies. All measurements will be reported as longitudinal x rakesh-posterior x transverse. [] : a homogeneous parenchyma [Right Thyroid] : right [Heterogeneous] : heterogenous nodule [Smooth] : smooth [Left Thyroid] : left [Mid] : mid pole there is a  [Solid] : solid [Hypoechoic] : hypoechoic nodule [Ovoid] : ovoid in shape [Thin] : has a thin halo [Peripheral vascularity] : peripheral vascularity [2] : 2 [No abnormal lymph nodes are seen.] : no abnormal lymph nodes are seen [FreeTextEntry1] : 3.65 x 1.19 x 1.45 [FreeTextEntry5] : 5.2 x 1.79 x 3.15 [FreeTextEntry2] : 0.29 [FreeTextEntry3] : 3.45 x 1.56 x 2.64

## 2023-07-19 NOTE — IMPRESSION
[FreeTextEntry1] : Left sided thyroid nodule is displaying interval stability, continue to observe [FreeTextEntry2] : Follow up ultrasound in one year

## 2023-07-19 NOTE — PHYSICAL EXAM
[Alert] : alert [Well Nourished] : well nourished [No Acute Distress] : no acute distress [Normal Sclera/Conjunctiva] : normal sclera/conjunctiva [EOMI] : extra ocular movement intact [PERRL] : pupils equal, round and reactive to light [Normal Outer Ear/Nose] : the ears and nose were normal in appearance [Normal Hearing] : hearing was normal [Normal TMs] : both tympanic membranes were normal [No Neck Mass] : no neck mass was observed [Thyroid Not Enlarged] : the thyroid was not enlarged [No Respiratory Distress] : no respiratory distress [Clear to Auscultation] : lungs were clear to auscultation bilaterally [Normal S1, S2] : normal S1 and S2 [Normal Rate] : heart rate was normal [Regular Rhythm] : with a regular rhythm [Normal Bowel Sounds] : normal bowel sounds [Not Tender] : non-tender [Soft] : abdomen soft [Normal Gait] : normal gait [No Clubbing, Cyanosis] : no clubbing  or cyanosis of the fingernails [No Joint Swelling] : no joint swelling seen [Normal Strength/Tone] : muscle strength and tone were normal [No Rash] : no rash [No Skin Lesions] : no skin lesions [No Motor Deficits] : the motor exam was normal [No Tremors] : no tremors [Normal Reflexes] : deep tendon reflexes were 2+ and symmetric [Oriented x3] : oriented to person, place, and time [Normal Affect] : the affect was normal [Normal Insight/Judgement] : insight and judgment were intact

## 2023-07-19 NOTE — PROCEDURE
[[employer] e 2008 model, 10-12 MHz frequencies] : multiple real time longitudinal and transverse images were obtained using a high resolution ultrasound with a linear transducer, [employer] e 2008 model, 10-12 MHz frequencies. All measurements will be reported as longitudinal x rakesh-posterior x transverse. [] : a homogeneous parenchyma [Right Thyroid] : right [Heterogeneous] : heterogenous nodule [Smooth] : smooth [Left Thyroid] : left [Mid] : mid pole there is a  [Solid] : solid [Hypoechoic] : hypoechoic nodule [Ovoid] : ovoid in shape [Thin] : has a thin halo [Peripheral vascularity] : peripheral vascularity [2] : 2 [No abnormal lymph nodes are seen.] : no abnormal lymph nodes are seen [FreeTextEntry5] : 5.2 x 1.79 x 3.15 [FreeTextEntry1] : 3.65 x 1.19 x 1.45 [FreeTextEntry2] : 0.29 [FreeTextEntry3] : 3.45 x 1.56 x 2.64

## 2023-09-13 ENCOUNTER — APPOINTMENT (OUTPATIENT)
Dept: ENDOCRINOLOGY | Facility: CLINIC | Age: 43
End: 2023-09-13

## 2023-11-22 ENCOUNTER — APPOINTMENT (OUTPATIENT)
Dept: ENDOCRINOLOGY | Facility: CLINIC | Age: 43
End: 2023-11-22
Payer: MEDICAID

## 2023-11-22 VITALS
BODY MASS INDEX: 38.47 KG/M2 | OXYGEN SATURATION: 97 % | HEART RATE: 96 BPM | DIASTOLIC BLOOD PRESSURE: 80 MMHG | SYSTOLIC BLOOD PRESSURE: 124 MMHG | HEIGHT: 67.32 IN | WEIGHT: 248 LBS

## 2023-11-22 DIAGNOSIS — E66.01 MORBID (SEVERE) OBESITY DUE TO EXCESS CALORIES: ICD-10-CM

## 2023-11-22 PROCEDURE — 99215 OFFICE O/P EST HI 40 MIN: CPT

## 2023-11-22 RX ORDER — ERGOCALCIFEROL 1.25 MG/1
1.25 MG CAPSULE ORAL
Qty: 12 | Refills: 3 | Status: ACTIVE | COMMUNITY
Start: 2018-10-29 | End: 1900-01-01

## 2024-03-20 ENCOUNTER — RX RENEWAL (OUTPATIENT)
Age: 44
End: 2024-03-20

## 2024-03-20 RX ORDER — LEVOTHYROXINE SODIUM 0.09 MG/1
88 TABLET ORAL DAILY
Qty: 30 | Refills: 5 | Status: ACTIVE | COMMUNITY
Start: 2017-02-24 | End: 1900-01-01

## 2024-05-15 ENCOUNTER — APPOINTMENT (OUTPATIENT)
Dept: ENDOCRINOLOGY | Facility: CLINIC | Age: 44
End: 2024-05-15
Payer: COMMERCIAL

## 2024-05-15 VITALS
DIASTOLIC BLOOD PRESSURE: 86 MMHG | SYSTOLIC BLOOD PRESSURE: 134 MMHG | HEART RATE: 85 BPM | BODY MASS INDEX: 39.4 KG/M2 | HEIGHT: 67.32 IN | OXYGEN SATURATION: 98 % | WEIGHT: 254 LBS

## 2024-05-15 DIAGNOSIS — E55.9 VITAMIN D DEFICIENCY, UNSPECIFIED: ICD-10-CM

## 2024-05-15 DIAGNOSIS — E78.1 PURE HYPERGLYCERIDEMIA: ICD-10-CM

## 2024-05-15 DIAGNOSIS — R73.03 PREDIABETES.: ICD-10-CM

## 2024-05-15 DIAGNOSIS — E03.8 OTHER SPECIFIED HYPOTHYROIDISM: ICD-10-CM

## 2024-05-15 DIAGNOSIS — E23.6 OTHER DISORDERS OF PITUITARY GLAND: ICD-10-CM

## 2024-05-15 DIAGNOSIS — E04.2 NONTOXIC MULTINODULAR GOITER: ICD-10-CM

## 2024-05-15 PROCEDURE — 99214 OFFICE O/P EST MOD 30 MIN: CPT

## 2024-05-15 PROCEDURE — G2211 COMPLEX E/M VISIT ADD ON: CPT

## 2024-05-15 NOTE — HISTORY OF PRESENT ILLNESS
[FreeTextEntry1] : 43 year old female presents for follow up of several endocrine issues. Central hypothyroidism:  Current regimen of levothyroxine -  88 mcg daily, occasionally misses a dose (misses about 3 doses per month) Denies fatigue, cold or heat intolerance. Reports had GI symptoms previously, now no longer with constipation or diarrhea. Denies tremors or heart palpitations. Denies increased hair loss. Had surgery December 2021 hysteroscopy found polyp, now has Mirena IUD placed. Now no longer having periods.  Thyroid nodules:  long standing history of multiple thyroid nodules and dominant left thyroid nodules that has been biopsied in 2005 and 2009 and reported as benign. Had ultrasound July 2021 and July 2022 stable size of nodule. Prior uptake and scan in 2014 showing functional nodule but TFTs showed picture of secondary hypothyroidism and patient was clinically complaining of weight gain and appeared to be hypothyroid and not hyperthyroid. She was initiated on levothyroxine with subsequent dose adjustments. Recent thyroid US with Dr. Tolliver 7/2023, stable one year follow up recommended. Denies compressive symptoms: no neck pain, neck swelling, dysphagia, or shortness of breath.  Has right eye erythema, saw optho, on prednisone ggt. Denies proptosis or blurry vision.   Obesity/preDM - Ozempic not covered, Wegovy not covered. Medicaid will not cover obesity pharmacotherapy. Difficulty with weight loss. Tried various diets on/off. In the past did well with keto diet. Prescribed metformin 500mg BID, but not taking, is not sure why she has not gotten in the routine of taking it. Exercise: "on and off"  Prior elevated IGF-1, glucose tolerance checked and was unrevealing. Previously chronic irregular periods, possible mild PCOS, didn't have issues getting pregnant. Noted with prediabetes several years ago. Most recent A1c 11/2022 5.5% Reports hair on chest and chin, not worsening, reports acne but not worsening   History of Vit D deficiency. Prescribed the ergocalciferol 50,000 weekly which she stopped taking a few months ago.  Previously had seen a functional medicine doctor prescribed supplements, not taking these now.  History of NAFLD. Fib-4 calculated 0.5 low risk

## 2024-05-15 NOTE — ASSESSMENT
[FreeTextEntry1] : 43F here for f/u of several endocrine issues:  1) Central hypothyroidism - Current regimen of levothyroxine 88 mcg daily, adherent with this, continue -clinically euthyroid - check TFTs  2) Pituitary cyst? - Pituitary labs checked last visit within normal limits, defer further imaging at this time - Prior elevated IGF-1 with negative OGTT - no additional workup at this time  3 Multinodular thyroid - thyroid US done with Dr. Tolliver July 2023 stable. - follow up 1 year  4) Obesity - Continue with lifestyle modification - Recent weight loss, BMI went from 43.07 ---> 39.63 --> 41.3 -> 38.5 - Ozempic not covered, Wegovy not covered. Medicaid will not cover obesity pharmacotherapy. - Was previously prescribed metformin 500mg BID, has not been taking  5) Prediabetes - trend HbA1c / prediabetes last HbA1c 6.0% will recheck If develops DM2 will prescribe GLP1RA (Mounjaro preferred) May do 75g OGTT to check for DM that way as well  6) Vit D deficiency - Vit D 20.8 continue ergocalciferol weekly supplement, misses doses sometimes  7) NAFLD calculated Fib-4 0.5, low risk, previous labs: lifestyle modification  8) Hyperlipidemia atorva 10mg, not adherent to taking this check lipid profile   F/u in 3-4 months

## 2024-05-15 NOTE — END OF VISIT
[Time Spent: ___ minutes] : I have spent [unfilled] minutes of time on the encounter. [FreeTextEntry3] : Continue levothyroxine, resume use of ergocalciferol 50,000 units weekly for D deficiency, obesity prescribe Wegovy to check coverage.

## 2024-05-17 LAB
25(OH)D3 SERPL-MCNC: 15.4 NG/ML
ALBUMIN SERPL ELPH-MCNC: 4.4 G/DL
ALP BLD-CCNC: 114 U/L
ALT SERPL-CCNC: 36 U/L
ANION GAP SERPL CALC-SCNC: 12 MMOL/L
AST SERPL-CCNC: 21 U/L
BASOPHILS # BLD AUTO: 0.07 K/UL
BASOPHILS NFR BLD AUTO: 0.8 %
BILIRUB SERPL-MCNC: 0.4 MG/DL
BUN SERPL-MCNC: 16 MG/DL
CALCIUM SERPL-MCNC: 10.3 MG/DL
CHLORIDE SERPL-SCNC: 104 MMOL/L
CHOLEST SERPL-MCNC: 182 MG/DL
CO2 SERPL-SCNC: 23 MMOL/L
CREAT SERPL-MCNC: 0.74 MG/DL
EGFR: 102 ML/MIN/1.73M2
EOSINOPHIL # BLD AUTO: 0.02 K/UL
EOSINOPHIL NFR BLD AUTO: 0.2 %
ESTIMATED AVERAGE GLUCOSE: 114 MG/DL
GLUCOSE SERPL-MCNC: 92 MG/DL
HBA1C MFR BLD HPLC: 5.6 %
HCT VFR BLD CALC: 45.8 %
HDLC SERPL-MCNC: 30 MG/DL
HGB BLD-MCNC: 15.4 G/DL
IMM GRANULOCYTES NFR BLD AUTO: 0.2 %
LDLC SERPL CALC-MCNC: 93 MG/DL
LYMPHOCYTES # BLD AUTO: 2.82 K/UL
LYMPHOCYTES NFR BLD AUTO: 30.6 %
MAN DIFF?: NORMAL
MCHC RBC-ENTMCNC: 27.5 PG
MCHC RBC-ENTMCNC: 33.6 GM/DL
MCV RBC AUTO: 81.9 FL
MONOCYTES # BLD AUTO: 0.58 K/UL
MONOCYTES NFR BLD AUTO: 6.3 %
NEUTROPHILS # BLD AUTO: 5.71 K/UL
NEUTROPHILS NFR BLD AUTO: 61.9 %
NONHDLC SERPL-MCNC: 152 MG/DL
PLATELET # BLD AUTO: 340 K/UL
POTASSIUM SERPL-SCNC: 4.6 MMOL/L
PROT SERPL-MCNC: 6.8 G/DL
RBC # BLD: 5.59 M/UL
RBC # FLD: 13 %
SODIUM SERPL-SCNC: 139 MMOL/L
T3 SERPL-MCNC: 159 NG/DL
T4 FREE SERPL-MCNC: 1.3 NG/DL
TRIGL SERPL-MCNC: 353 MG/DL
TSH SERPL-ACNC: 0.04 UIU/ML
WBC # FLD AUTO: 9.22 K/UL

## 2024-05-28 ENCOUNTER — NON-APPOINTMENT (OUTPATIENT)
Age: 44
End: 2024-05-28

## 2024-09-04 ENCOUNTER — APPOINTMENT (OUTPATIENT)
Dept: ENDOCRINOLOGY | Facility: CLINIC | Age: 44
End: 2024-09-04
Payer: COMMERCIAL

## 2024-09-04 VITALS
HEART RATE: 103 BPM | HEIGHT: 67.32 IN | SYSTOLIC BLOOD PRESSURE: 132 MMHG | DIASTOLIC BLOOD PRESSURE: 80 MMHG | BODY MASS INDEX: 39.71 KG/M2 | OXYGEN SATURATION: 98 % | WEIGHT: 256 LBS

## 2024-09-04 DIAGNOSIS — E03.8 OTHER SPECIFIED HYPOTHYROIDISM: ICD-10-CM

## 2024-09-04 DIAGNOSIS — R73.03 PREDIABETES.: ICD-10-CM

## 2024-09-04 DIAGNOSIS — E04.2 NONTOXIC MULTINODULAR GOITER: ICD-10-CM

## 2024-09-04 DIAGNOSIS — E55.9 VITAMIN D DEFICIENCY, UNSPECIFIED: ICD-10-CM

## 2024-09-04 DIAGNOSIS — E78.1 PURE HYPERGLYCERIDEMIA: ICD-10-CM

## 2024-09-04 DIAGNOSIS — R94.6 ABNORMAL RESULTS OF THYROID FUNCTION STUDIES: ICD-10-CM

## 2024-09-04 DIAGNOSIS — E23.6 OTHER DISORDERS OF PITUITARY GLAND: ICD-10-CM

## 2024-09-04 PROCEDURE — G2211 COMPLEX E/M VISIT ADD ON: CPT

## 2024-09-04 PROCEDURE — 99214 OFFICE O/P EST MOD 30 MIN: CPT

## 2024-09-04 NOTE — ASSESSMENT
[FreeTextEntry1] : 43F here for f/u of several endocrine issues:  1) Central hypothyroidism - Current regimen of levothyroxine 88 mcg daily, adherent with this, continue -clinically euthyroid - check TFTs  2) Pituitary cyst? - Pituitary labs checked last visit within normal limits, defer further imaging at this time - Prior elevated IGF-1 with negative OGTT - will order pituitary panel at 8AM  3 Multinodular thyroid - thyroid US done with Dr. Tolliver July 2023 stable. - follow up 1 year  4) Obesity - Continue with lifestyle modification - Recent weight loss, BMI went from 43.07 ---> 39.63 --> 41.3 -> 38.5 - Ozempic not covered, Wegovy not covered. Medicaid will not cover obesity pharmacotherapy. - Was previously prescribed metformin 500mg BID, has not been taking  5) Prediabetes - trend HbA1c / prediabetes last HbA1c 6.0% will recheck If develops DM2 will prescribe GLP1RA (Mounjaro preferred) 75g OGTT in preDM range at all time points  6) Vit D deficiency - Vit D 20.8 continue ergocalciferol weekly supplement, misses doses sometimes  7) NAFLD calculated Fib-4 0.45, low risk, previous labs: lifestyle modification - has been doing pilates and swimming  8) Hyperlipidemia atorva 10mg, not adherent to taking this, will set alarm on phone check lipid profile   F/u in 6 months

## 2024-09-04 NOTE — HISTORY OF PRESENT ILLNESS
[FreeTextEntry1] : 43 year old female presents for follow up of several endocrine issues. Central hypothyroidism:  Current regimen of levothyroxine -  88 mcg daily, occasionally misses a dose (misses about 3 doses per month) Denies fatigue, cold or heat intolerance. Reports had GI symptoms previously, now no longer with constipation or diarrhea. Denies tremors or heart palpitations. Denies increased hair loss. Had surgery December 2021 hysteroscopy found polyp, now has Mirena IUD placed. Now no longer having periods.  Thyroid nodules:  long standing history of multiple thyroid nodules and dominant left thyroid nodules that has been biopsied in 2005 and 2009 and reported as benign. Had ultrasound July 2021 and July 2022 stable size of nodule. Prior uptake and scan in 2014 showing functional nodule but TFTs showed picture of secondary hypothyroidism and patient was clinically complaining of weight gain and appeared to be hypothyroid and not hyperthyroid. She was initiated on levothyroxine with subsequent dose adjustments. Recent thyroid US with Dr. Tolliver 7/2023, stable one year follow up recommended. Denies compressive symptoms: no neck pain, neck swelling, dysphagia, or shortness of breath.  Has right eye erythema, saw optho, on prednisone ggt. Denies proptosis or blurry vision.   Obesity/preDM - Ozempic not covered, Wegovy not covered. Medicaid will not cover obesity pharmacotherapy. Difficulty with weight loss. Tried various diets on/off. In the past did well with keto diet. Prescribed metformin 500mg BID, but not taking, is not sure why she has not gotten in the routine of taking it. Exercise: "on and off"  Prior elevated IGF-1, glucose tolerance checked and was unrevealing. Previously chronic irregular periods, possible mild PCOS, didn't have issues getting pregnant. Noted with prediabetes several years ago. Most recent A1c 11/2022 5.5% Reports hair on chest and chin, not worsening, reports acne but not worsening   History of Vit D deficiency. Prescribed the ergocalciferol 50,000 weekly which she stopped taking a few months ago.  Previously had seen a functional medicine doctor prescribed supplements, not taking these now.  History of NAFLD. Fib-4 calculated 0.45 low risk (9/4/24)  Prescribed a statin but reports always forgets to take it. Has been doing pilates and swimming in summer.

## 2024-09-09 LAB
ACTH SER-ACNC: 12.8 PG/ML
ALBUMIN SERPL ELPH-MCNC: 4.3 G/DL
ALP BLD-CCNC: 117 U/L
ALT SERPL-CCNC: 62 U/L
ANION GAP SERPL CALC-SCNC: 13 MMOL/L
AST SERPL-CCNC: 37 U/L
BILIRUB SERPL-MCNC: 0.5 MG/DL
BUN SERPL-MCNC: 14 MG/DL
CALCIUM SERPL-MCNC: 10 MG/DL
CHLORIDE SERPL-SCNC: 106 MMOL/L
CO2 SERPL-SCNC: 20 MMOL/L
CORTIS SERPL-MCNC: 10.7 UG/DL
CREAT SERPL-MCNC: 0.7 MG/DL
EGFR: 109 ML/MIN/1.73M2
ESTIMATED AVERAGE GLUCOSE: 117 MG/DL
ESTRADIOL SERPL-MCNC: 39 PG/ML
FSH SERPL-MCNC: 6.8 IU/L
GH SERPL-MCNC: 0.04 NG/ML
GLUCOSE SERPL-MCNC: 100 MG/DL
HBA1C MFR BLD HPLC: 5.7 %
LH SERPL-ACNC: 9.3 IU/L
POTASSIUM SERPL-SCNC: 4.4 MMOL/L
PROLACTIN SERPL-MCNC: 6 NG/ML
PROT SERPL-MCNC: 7 G/DL
SODIUM SERPL-SCNC: 140 MMOL/L
T4 FREE SERPL-MCNC: 1.3 NG/DL
TSH SERPL-ACNC: 0.02 UIU/ML

## 2024-09-10 LAB — IGF BP1 SERPL-MCNC: 214 NG/ML

## 2024-09-23 ENCOUNTER — RX RENEWAL (OUTPATIENT)
Age: 44
End: 2024-09-23

## 2024-10-08 RX ORDER — METFORMIN HYDROCHLORIDE 500 MG/1
500 TABLET, COATED ORAL
Qty: 60 | Refills: 4 | Status: ACTIVE | COMMUNITY
Start: 2024-10-08 | End: 1900-01-01

## 2025-01-16 ENCOUNTER — RX RENEWAL (OUTPATIENT)
Age: 45
End: 2025-01-16

## 2025-01-16 RX ORDER — ERGOCALCIFEROL 1.25 MG/1
1.25 MG CAPSULE, LIQUID FILLED ORAL
Qty: 12 | Refills: 3 | Status: ACTIVE | COMMUNITY
Start: 2025-01-16 | End: 1900-01-01

## 2025-03-05 ENCOUNTER — APPOINTMENT (OUTPATIENT)
Dept: ENDOCRINOLOGY | Facility: CLINIC | Age: 45
End: 2025-03-05
Payer: COMMERCIAL

## 2025-03-05 VITALS
WEIGHT: 260 LBS | OXYGEN SATURATION: 97 % | SYSTOLIC BLOOD PRESSURE: 118 MMHG | DIASTOLIC BLOOD PRESSURE: 80 MMHG | HEART RATE: 94 BPM | HEIGHT: 67.32 IN | BODY MASS INDEX: 40.33 KG/M2

## 2025-03-05 DIAGNOSIS — E03.8 OTHER SPECIFIED HYPOTHYROIDISM: ICD-10-CM

## 2025-03-05 DIAGNOSIS — E78.1 PURE HYPERGLYCERIDEMIA: ICD-10-CM

## 2025-03-05 DIAGNOSIS — E23.6 OTHER DISORDERS OF PITUITARY GLAND: ICD-10-CM

## 2025-03-05 DIAGNOSIS — E55.9 VITAMIN D DEFICIENCY, UNSPECIFIED: ICD-10-CM

## 2025-03-05 DIAGNOSIS — E66.01 MORBID (SEVERE) OBESITY DUE TO EXCESS CALORIES: ICD-10-CM

## 2025-03-05 DIAGNOSIS — E04.2 NONTOXIC MULTINODULAR GOITER: ICD-10-CM

## 2025-03-05 DIAGNOSIS — R73.03 PREDIABETES.: ICD-10-CM

## 2025-03-05 PROCEDURE — 99215 OFFICE O/P EST HI 40 MIN: CPT

## 2025-03-05 PROCEDURE — G2211 COMPLEX E/M VISIT ADD ON: CPT

## 2025-03-31 ENCOUNTER — RX RENEWAL (OUTPATIENT)
Age: 45
End: 2025-03-31

## 2025-04-16 ENCOUNTER — RX RENEWAL (OUTPATIENT)
Age: 45
End: 2025-04-16

## 2025-07-07 ENCOUNTER — NON-APPOINTMENT (OUTPATIENT)
Age: 45
End: 2025-07-07

## 2025-07-07 PROBLEM — E83.52 HYPERCALCEMIA: Status: ACTIVE | Noted: 2025-07-07

## 2025-08-01 ENCOUNTER — NON-APPOINTMENT (OUTPATIENT)
Age: 45
End: 2025-08-01

## 2025-09-08 ENCOUNTER — RX RENEWAL (OUTPATIENT)
Age: 45
End: 2025-09-08

## 2025-09-10 ENCOUNTER — APPOINTMENT (OUTPATIENT)
Dept: ENDOCRINOLOGY | Facility: CLINIC | Age: 45
End: 2025-09-10

## 2025-09-10 VITALS
HEIGHT: 67.32 IN | WEIGHT: 254 LBS | BODY MASS INDEX: 39.4 KG/M2 | SYSTOLIC BLOOD PRESSURE: 126 MMHG | DIASTOLIC BLOOD PRESSURE: 80 MMHG | OXYGEN SATURATION: 98 % | HEART RATE: 84 BPM

## 2025-09-10 DIAGNOSIS — E83.52 HYPERCALCEMIA: ICD-10-CM

## 2025-09-10 DIAGNOSIS — E21.3 HYPERPARATHYROIDISM, UNSPECIFIED: ICD-10-CM

## 2025-09-10 DIAGNOSIS — R73.03 PREDIABETES.: ICD-10-CM

## 2025-09-10 DIAGNOSIS — E03.8 OTHER SPECIFIED HYPOTHYROIDISM: ICD-10-CM

## 2025-09-10 DIAGNOSIS — E66.01 MORBID (SEVERE) OBESITY DUE TO EXCESS CALORIES: ICD-10-CM

## 2025-09-10 PROCEDURE — G2211 COMPLEX E/M VISIT ADD ON: CPT | Mod: GC

## 2025-09-10 PROCEDURE — 99215 OFFICE O/P EST HI 40 MIN: CPT | Mod: GC

## 2025-09-10 RX ORDER — LIRAGLUTIDE 6 MG/ML
18 INJECTION, SOLUTION SUBCUTANEOUS
Qty: 3 | Refills: 0 | Status: ACTIVE | COMMUNITY
Start: 2025-09-10

## 2025-09-12 LAB
ALBUMIN SERPL ELPH-MCNC: 4.2 G/DL
ALP BLD-CCNC: 136 U/L
ALT SERPL-CCNC: 71 U/L
ANION GAP SERPL CALC-SCNC: 12 MMOL/L
AST SERPL-CCNC: 38 U/L
BILIRUB SERPL-MCNC: 0.5 MG/DL
BUN SERPL-MCNC: 17 MG/DL
CALCIUM SERPL-MCNC: 11.5 MG/DL
CHLORIDE SERPL-SCNC: 106 MMOL/L
CO2 SERPL-SCNC: 20 MMOL/L
CREAT SERPL-MCNC: 0.63 MG/DL
EGFRCR SERPLBLD CKD-EPI 2021: 111 ML/MIN/1.73M2
GLUCOSE SERPL-MCNC: 95 MG/DL
POTASSIUM SERPL-SCNC: 4.7 MMOL/L
PROT SERPL-MCNC: 6.9 G/DL
SODIUM SERPL-SCNC: 139 MMOL/L

## 2025-09-16 ENCOUNTER — ASOB RESULT (OUTPATIENT)
Age: 45
End: 2025-09-16

## 2025-09-16 ENCOUNTER — APPOINTMENT (OUTPATIENT)
Dept: ULTRASOUND IMAGING | Facility: CLINIC | Age: 45
End: 2025-09-16
Payer: COMMERCIAL

## 2025-09-16 ENCOUNTER — APPOINTMENT (OUTPATIENT)
Facility: CLINIC | Age: 45
End: 2025-09-16

## 2025-09-16 ENCOUNTER — RESULT CHARGE (OUTPATIENT)
Age: 45
End: 2025-09-16

## 2025-09-16 VITALS — SYSTOLIC BLOOD PRESSURE: 126 MMHG | DIASTOLIC BLOOD PRESSURE: 76 MMHG

## 2025-09-16 LAB — HCG UR QL: NEGATIVE

## 2025-09-16 PROCEDURE — 76536 US EXAM OF HEAD AND NECK: CPT | Mod: 26

## 2025-09-19 LAB — HPV HIGH+LOW RISK DNA PNL CVX: NOT DETECTED

## 2025-09-25 LAB — CYTOLOGY CVX/VAG DOC THIN PREP: NORMAL
